# Patient Record
Sex: MALE | Race: WHITE | Employment: OTHER | URBAN - NONMETROPOLITAN AREA
[De-identification: names, ages, dates, MRNs, and addresses within clinical notes are randomized per-mention and may not be internally consistent; named-entity substitution may affect disease eponyms.]

---

## 2022-01-03 ENCOUNTER — OFFICE VISIT (OUTPATIENT)
Dept: PRIMARY CARE CLINIC | Age: 67
End: 2022-01-03
Payer: MEDICARE

## 2022-01-03 ENCOUNTER — HOSPITAL ENCOUNTER (OUTPATIENT)
Dept: GENERAL RADIOLOGY | Age: 67
Discharge: HOME | End: 2022-01-05
Payer: MEDICARE

## 2022-01-03 VITALS
RESPIRATION RATE: 18 BRPM | HEIGHT: 67 IN | WEIGHT: 167.5 LBS | SYSTOLIC BLOOD PRESSURE: 128 MMHG | HEART RATE: 60 BPM | DIASTOLIC BLOOD PRESSURE: 86 MMHG | TEMPERATURE: 97.4 F | OXYGEN SATURATION: 97 % | BODY MASS INDEX: 26.29 KG/M2

## 2022-01-03 DIAGNOSIS — M54.6 RIGHT-SIDED THORACIC BACK PAIN, UNSPECIFIED CHRONICITY: ICD-10-CM

## 2022-01-03 DIAGNOSIS — M54.6 RIGHT-SIDED THORACIC BACK PAIN, UNSPECIFIED CHRONICITY: Primary | ICD-10-CM

## 2022-01-03 PROCEDURE — 71101 X-RAY EXAM UNILAT RIBS/CHEST: CPT

## 2022-01-03 PROCEDURE — 99203 OFFICE O/P NEW LOW 30 MIN: CPT | Performed by: FAMILY MEDICINE

## 2022-01-03 PROCEDURE — 99203 OFFICE O/P NEW LOW 30 MIN: CPT

## 2022-01-03 ASSESSMENT — PATIENT HEALTH QUESTIONNAIRE - PHQ9
1. LITTLE INTEREST OR PLEASURE IN DOING THINGS: 0
SUM OF ALL RESPONSES TO PHQ QUESTIONS 1-9: 0
2. FEELING DOWN, DEPRESSED OR HOPELESS: 0
SUM OF ALL RESPONSES TO PHQ QUESTIONS 1-9: 0
SUM OF ALL RESPONSES TO PHQ9 QUESTIONS 1 & 2: 0

## 2022-01-03 NOTE — PROGRESS NOTES
1/3/2022     Geraldo Shoemaker (:  1955) is a 74 y.o. male, here for evaluation of the following medical concerns:    Chest Pain   This is a new problem. The current episode started yesterday (slipped on ice yesterday and landed on the right posterior rib region ). The onset quality is sudden. The problem occurs constantly. The problem has been unchanged. The pain is present in the lateral region (right lower posterior and lateral ribs). The pain is moderate. The quality of the pain is described as sharp. Associated symptoms include back pain. Pertinent negatives include no cough, fever or shortness of breath. He has tried acetaminophen for the symptoms. The treatment provided mild relief. Did review patient's med list, allergies, social history,pmhx and pshx today as noted in the record. Review of Systems   Constitutional: Negative for chills, fatigue and fever. Respiratory: Negative for cough and shortness of breath. Cardiovascular: Positive for chest pain. Musculoskeletal: Positive for back pain and myalgias. Prior to Visit Medications    Not on File        Social History     Tobacco Use    Smoking status: Never Smoker    Smokeless tobacco: Never Used   Substance Use Topics    Alcohol use: Not on file        Vitals:    22 1219   BP: 128/86   Site: Left Upper Arm   Position: Sitting   Cuff Size: Large Adult   Pulse: 60   Resp: 18   Temp: 97.4 °F (36.3 °C)   TempSrc: Temporal   SpO2: 97%   Weight: 167 lb 8 oz (76 kg)   Height: 5' 7\" (1.702 m)     Estimated body mass index is 26.23 kg/m² as calculated from the following:    Height as of this encounter: 5' 7\" (1.702 m). Weight as of this encounter: 167 lb 8 oz (76 kg). Physical Exam  Vitals and nursing note reviewed. Constitutional:       General: He is not in acute distress. Appearance: He is well-developed. He is not diaphoretic. HENT:      Head: Normocephalic and atraumatic.    Eyes:      Conjunctiva/sclera: Conjunctivae normal.   Pulmonary:      Effort: Pulmonary effort is normal.      Breath sounds: Normal breath sounds. No wheezing, rhonchi or rales. Comments: Right posterior lower ribs with pain with palpation to ribs 9 and 10. No hematoma noted to the skin surface. Moves he chest wall in a normal range of motion without significant difficulty. Chest:      Chest wall: Tenderness present. Musculoskeletal:      Cervical back: Normal range of motion. Skin:     General: Skin is warm and dry. Coloration: Skin is not pale. Findings: No erythema or rash. Neurological:      Mental Status: He is alert and oriented to person, place, and time. Psychiatric:         Behavior: Behavior normal.         Thought Content: Thought content normal.         Judgment: Judgment normal.         ASSESSMENT/PLAN:  Encounter Diagnosis   Name Primary?  Right-sided thoracic back pain, unspecified chronicity Yes     No orders of the defined types were placed in this encounter. Orders Placed This Encounter   Procedures    XR RIBS RIGHT INCLUDE CHEST (MIN 3 VIEWS)     Standing Status:   Future     Number of Occurrences:   1     Standing Expiration Date:   1/3/2023     Order Specific Question:   Reason for exam:     Answer:   R side rib pain following a fall yesterday     I did personally review his xrays with him today. I do not appreciate any acute fracture. Would use ice to the area and make sure that he is taking good deep breaths. Patient is to use tylenol or ibuprofen as needed for pain. Return  if no improvement in symptoms or if any further symptoms arise. No follow-ups on file. An electronic signature was used to authenticate this note.     --Janay Interiano DO on 1/3/2022 at 12:37 PM

## 2022-01-04 ASSESSMENT — ENCOUNTER SYMPTOMS
SHORTNESS OF BREATH: 0
COUGH: 0
BACK PAIN: 1

## 2022-01-14 ENCOUNTER — OFFICE VISIT (OUTPATIENT)
Dept: FAMILY MEDICINE CLINIC | Age: 67
End: 2022-01-14
Payer: MEDICARE

## 2022-01-14 VITALS
OXYGEN SATURATION: 98 % | TEMPERATURE: 98 F | HEART RATE: 63 BPM | DIASTOLIC BLOOD PRESSURE: 70 MMHG | WEIGHT: 166.8 LBS | BODY MASS INDEX: 26.18 KG/M2 | SYSTOLIC BLOOD PRESSURE: 130 MMHG | HEIGHT: 67 IN

## 2022-01-14 DIAGNOSIS — Z13.1 SCREENING FOR DIABETES MELLITUS: ICD-10-CM

## 2022-01-14 DIAGNOSIS — Z13.220 SCREENING CHOLESTEROL LEVEL: ICD-10-CM

## 2022-01-14 DIAGNOSIS — R07.81 RIB PAIN: ICD-10-CM

## 2022-01-14 DIAGNOSIS — N40.1 BENIGN PROSTATIC HYPERPLASIA WITH URINARY HESITANCY: Primary | ICD-10-CM

## 2022-01-14 DIAGNOSIS — Z12.5 SCREENING FOR MALIGNANT NEOPLASM OF PROSTATE: ICD-10-CM

## 2022-01-14 DIAGNOSIS — Z91.81 AT HIGH RISK FOR FALLS: ICD-10-CM

## 2022-01-14 DIAGNOSIS — R39.11 BENIGN PROSTATIC HYPERPLASIA WITH URINARY HESITANCY: Primary | ICD-10-CM

## 2022-01-14 PROCEDURE — 99212 OFFICE O/P EST SF 10 MIN: CPT

## 2022-01-14 PROCEDURE — 99213 OFFICE O/P EST LOW 20 MIN: CPT | Performed by: FAMILY MEDICINE

## 2022-01-14 SDOH — ECONOMIC STABILITY: FOOD INSECURITY: WITHIN THE PAST 12 MONTHS, THE FOOD YOU BOUGHT JUST DIDN'T LAST AND YOU DIDN'T HAVE MONEY TO GET MORE.: PATIENT DECLINED

## 2022-01-14 SDOH — ECONOMIC STABILITY: FOOD INSECURITY: WITHIN THE PAST 12 MONTHS, YOU WORRIED THAT YOUR FOOD WOULD RUN OUT BEFORE YOU GOT MONEY TO BUY MORE.: PATIENT DECLINED

## 2022-01-14 ASSESSMENT — SOCIAL DETERMINANTS OF HEALTH (SDOH): HOW HARD IS IT FOR YOU TO PAY FOR THE VERY BASICS LIKE FOOD, HOUSING, MEDICAL CARE, AND HEATING?: PATIENT DECLINED

## 2022-01-14 ASSESSMENT — ENCOUNTER SYMPTOMS
COUGH: 0
ABDOMINAL PAIN: 0
WHEEZING: 0
CHEST TIGHTNESS: 0
SHORTNESS OF BREATH: 0
DIARRHEA: 0
NAUSEA: 0

## 2022-01-14 NOTE — PROGRESS NOTES
[facility] [address]  [address] [address]  Dept: [phone]  Dept Fax: [phone]  Loc: [phone]    Jason eVga is a 74 y.o. male who presents today for his medical conditions/complaints as noted below. Jason Vega is c/o of   Chief Complaint   Patient presents with   [employer] Established New Doctor     keegan Mireles pt    Fall     injured right sided ribs last week getting out of camper       HPI:     HPI Here today to establish care. He was previously seeing Dr Nikkie Collier. He has been doing well. He occasionally has trouble initialing the urine stream in the morning and he occasionally gets up in the night to pee but not every night. He was on meds in the past for this but it made him really spaced out. He tries to urinate frequently to help keep his bladder empty. He has not pain with urination. He fell when he was climbing out of his camper and he slipped on ice on the step of his camper. He landed on the step on his back. The pain is also on the right lower rib. He was in UC on 1/12 and his xray was normal. He takes aleve occasionally. Past Medical History:   Diagnosis Date    Foot fracture 11/21/2016    left 5th Metatarsal fx- Dr. Carty Phlegm    History of pneumonia     Multiple fractures 06/06/2010    pelvis, sacrum, left ribs- due to 17 foot fall ( TV antenna tower)          Social History     Tobacco Use    Smoking status: Never Smoker    Smokeless tobacco: Never Used   Substance Use Topics    Alcohol use: Not on file     No current outpatient medications on file. No current facility-administered medications for this visit. Allergies   Allergen Reactions    Silodosin     Tamsulosin      Confusion       Subjective:     Review of Systems   Constitutional: Negative for activity change, appetite change, chills, fatigue and fever.    Eyes: Negative for visual disturbance. Respiratory: Negative for cough, chest tightness, shortness of breath and wheezing. Cardiovascular: Negative for chest pain, palpitations and leg swelling. Gastrointestinal: Negative for abdominal pain, diarrhea and nausea. Genitourinary: Negative for difficulty urinating. Skin: Negative for rash. Neurological: Negative for dizziness, syncope, weakness, light-headedness and headaches. Psychiatric/Behavioral: Negative for decreased concentration, dysphoric mood and sleep disturbance. The patient is not nervous/anxious. Objective:      Physical Exam  Vitals and nursing note reviewed. Constitutional:       General: He is not in acute distress. Appearance: He is well-developed. Eyes:      Conjunctiva/sclera: Conjunctivae normal.   Cardiovascular:      Rate and Rhythm: Normal rate and regular rhythm. Heart sounds: Normal heart sounds. No murmur heard. Pulmonary:      Effort: Pulmonary effort is normal. No respiratory distress. Breath sounds: Normal breath sounds. No wheezing or rales. Musculoskeletal:         General: Normal range of motion. Cervical back: Normal range of motion and neck supple. Lymphadenopathy:      Cervical: No cervical adenopathy. Skin:     General: Skin is warm and dry. Findings: No rash. Neurological:      Mental Status: He is alert and oriented to person, place, and time. Psychiatric:         Mood and Affect: Mood normal.         Behavior: Behavior normal.         Thought Content: Thought content normal.         Judgment: Judgment normal.       /70   Pulse 63   Temp 98 °F (36.7 °C)   Ht 5' 7\" (1.702 m)   Wt 166 lb 12.8 oz (75.7 kg)   SpO2 98%   BMI 26.12 kg/m²     Assessment:       Diagnosis Orders   1. Benign prostatic hyperplasia with urinary hesitancy     2. Rib pain     3. At high risk for falls     4. Screening for diabetes mellitus  Basic Metabolic Panel   5. Screening cholesterol level  Lipid Panel   6. Screening for malignant neoplasm of prostate  PSA Screening             Plan:        BPH: stable; he has been doing well without medication. He did not like the side effects of proscar so that was discontinued by his previous pcp. Rib pain: new; seems to be a bruise and some costochondritis. I recommended ice or heat and I suggested prednisone but he wanted to hold off on that for now. Return in about 6 months (around 7/14/2022) for [address] Orders Placed This Encounter   Procedures    Basic Metabolic Panel     Standing Status:   Future     Standing Expiration Date:   1/14/2023    Lipid Panel     Standing Status:   Future     Standing Expiration Date:   1/14/2023     Order Specific Question:   Is Patient Fasting?/# of Hours     Answer:   0 per dr Sidney Webb PSA Screening     Standing Status:   Future     Standing Expiration Date:   1/14/2023       Patientgiven educational materials - see patient instructions. Discussed use, benefit,and side effects of prescribed medications. All patient questions answered. Ptvoiced understanding. Reviewed health maintenance. Instructed to continue currentmedications, diet and exercise. Patient agreed with treatment plan. Follow up asdirected. Electronically signed by Fabian Vernon MD on 1/14/2022 at 10:58 AM  On the basis of positive falls risk screening, assessment and plan is as follows: home safety tips provided.

## 2022-07-12 ENCOUNTER — HOSPITAL ENCOUNTER (OUTPATIENT)
Dept: LAB | Age: 67
Discharge: HOME | End: 2022-07-12
Payer: MEDICARE

## 2022-07-12 DIAGNOSIS — Z13.1 SCREENING FOR DIABETES MELLITUS: ICD-10-CM

## 2022-07-12 DIAGNOSIS — Z12.5 SCREENING FOR MALIGNANT NEOPLASM OF PROSTATE: ICD-10-CM

## 2022-07-12 LAB
ANION GAP SERPL CALCULATED.3IONS-SCNC: 10 MMOL/L (ref 9–17)
BUN BLDV-MCNC: 18 MG/DL (ref 8–23)
BUN/CREAT BLD: 21 (ref 9–20)
CALCIUM SERPL-MCNC: 9.1 MG/DL (ref 8.6–10.4)
CHLORIDE BLD-SCNC: 108 MMOL/L (ref 98–107)
CO2: 24 MMOL/L (ref 20–31)
CREAT SERPL-MCNC: 0.87 MG/DL (ref 0.7–1.2)
GFR AFRICAN AMERICAN: >60 ML/MIN
GFR NON-AFRICAN AMERICAN: >60 ML/MIN
GFR SERPL CREATININE-BSD FRML MDRD: ABNORMAL ML/MIN/{1.73_M2}
GLUCOSE BLD-MCNC: 100 MG/DL (ref 70–99)
POTASSIUM SERPL-SCNC: 3.9 MMOL/L (ref 3.7–5.3)
PROSTATE SPECIFIC ANTIGEN: 2.62 NG/ML
SODIUM BLD-SCNC: 142 MMOL/L (ref 135–144)

## 2022-07-12 PROCEDURE — 80048 BASIC METABOLIC PNL TOTAL CA: CPT

## 2022-07-12 PROCEDURE — 36415 COLL VENOUS BLD VENIPUNCTURE: CPT

## 2022-07-12 PROCEDURE — G0103 PSA SCREENING: HCPCS

## 2022-07-19 ENCOUNTER — OFFICE VISIT (OUTPATIENT)
Dept: FAMILY MEDICINE CLINIC | Age: 67
End: 2022-07-19
Payer: MEDICARE

## 2022-07-19 VITALS
WEIGHT: 171 LBS | DIASTOLIC BLOOD PRESSURE: 68 MMHG | BODY MASS INDEX: 26.84 KG/M2 | HEART RATE: 58 BPM | SYSTOLIC BLOOD PRESSURE: 139 MMHG | HEIGHT: 67 IN | OXYGEN SATURATION: 98 % | TEMPERATURE: 97 F

## 2022-07-19 DIAGNOSIS — R39.11 BENIGN PROSTATIC HYPERPLASIA WITH URINARY HESITANCY: ICD-10-CM

## 2022-07-19 DIAGNOSIS — Z00.00 INITIAL MEDICARE ANNUAL WELLNESS VISIT: Primary | ICD-10-CM

## 2022-07-19 DIAGNOSIS — Z12.5 SCREENING FOR PROSTATE CANCER: ICD-10-CM

## 2022-07-19 DIAGNOSIS — N40.1 BENIGN PROSTATIC HYPERPLASIA WITH URINARY HESITANCY: ICD-10-CM

## 2022-07-19 DIAGNOSIS — M67.441 GANGLION CYST OF FINGER OF RIGHT HAND: ICD-10-CM

## 2022-07-19 DIAGNOSIS — K40.90 LEFT INGUINAL HERNIA: ICD-10-CM

## 2022-07-19 DIAGNOSIS — Z71.89 COUNSELING FOR LIVING WILL: ICD-10-CM

## 2022-07-19 PROCEDURE — G0438 PPPS, INITIAL VISIT: HCPCS | Performed by: FAMILY MEDICINE

## 2022-07-19 PROCEDURE — 99213 OFFICE O/P EST LOW 20 MIN: CPT

## 2022-07-19 PROCEDURE — 1123F ACP DISCUSS/DSCN MKR DOCD: CPT | Performed by: FAMILY MEDICINE

## 2022-07-19 PROCEDURE — 99214 OFFICE O/P EST MOD 30 MIN: CPT | Performed by: FAMILY MEDICINE

## 2022-07-19 RX ORDER — TADALAFIL 5 MG/1
5 TABLET ORAL DAILY
Qty: 90 TABLET | Refills: 1 | Status: SHIPPED | OUTPATIENT
Start: 2022-07-19

## 2022-07-19 ASSESSMENT — LIFESTYLE VARIABLES
HOW OFTEN DO YOU HAVE A DRINK CONTAINING ALCOHOL: MONTHLY OR LESS
HOW MANY STANDARD DRINKS CONTAINING ALCOHOL DO YOU HAVE ON A TYPICAL DAY: 1 OR 2

## 2022-07-19 ASSESSMENT — PATIENT HEALTH QUESTIONNAIRE - PHQ9
SUM OF ALL RESPONSES TO PHQ QUESTIONS 1-9: 0
SUM OF ALL RESPONSES TO PHQ QUESTIONS 1-9: 0
2. FEELING DOWN, DEPRESSED OR HOPELESS: 0
SUM OF ALL RESPONSES TO PHQ9 QUESTIONS 1 & 2: 0
SUM OF ALL RESPONSES TO PHQ QUESTIONS 1-9: 0
1. LITTLE INTEREST OR PLEASURE IN DOING THINGS: 0
SUM OF ALL RESPONSES TO PHQ QUESTIONS 1-9: 0

## 2022-07-19 ASSESSMENT — ENCOUNTER SYMPTOMS
CHEST TIGHTNESS: 0
ABDOMINAL PAIN: 0
WHEEZING: 0
CONSTIPATION: 0
DIARRHEA: 0
SHORTNESS OF BREATH: 0
BACK PAIN: 0
COUGH: 0

## 2022-07-19 NOTE — PATIENT INSTRUCTIONS
Personalized Preventive Plan for Shahriar Hollins - 7/19/2022  Medicare offers a range of preventive health benefits. Some of the tests and screenings are paid in full while other may be subject to a deductible, co-insurance, and/or copay. Some of these benefits include a comprehensive review of your medical history including lifestyle, illnesses that may run in your family, and various assessments and screenings as appropriate. After reviewing your medical record and screening and assessments performed today your provider may have ordered immunizations, labs, imaging, and/or referrals for you. A list of these orders (if applicable) as well as your Preventive Care list are included within your After Visit Summary for your review. Other Preventive Recommendations:    A preventive eye exam performed by an eye specialist is recommended every 1-2 years to screen for glaucoma; cataracts, macular degeneration, and other eye disorders. A preventive dental visit is recommended every 6 months. Try to get at least 150 minutes of exercise per week or 10,000 steps per day on a pedometer . Order or download the FREE \"Exercise & Physical Activity: Your Everyday Guide\" from [employer]. Call 0-930.969.4746 or search [employer] online. You need 9888-0084 mg of calcium and 5589-8119 IU of vitamin D per day. It is possible to meet your calcium requirement with diet alone, but a vitamin D supplement is usually necessary to meet this goal.  When exposed to the sun, use a sunscreen that protects against both UVA and UVB radiation with an SPF of 30 or greater. Reapply every 2 to 3 hours or after sweating, drying off with a towel, or swimming. Always wear a seat belt when traveling in a car. Always wear a helmet when riding a bicycle or motorcycle.

## 2022-07-19 NOTE — PROGRESS NOTES
[facility] [address]  [address] [address]  Dept: [phone]  Dept Fax: [phone]  Loc: [phone]    Conrad Ramsey is a 69 y.o. male who presents today for his medical conditions/complaints as noted below. Conrad Ramsey is c/o of   Chief Complaint   Patient presents with    Medicare AWV    6 Month Follow-Up              HPI:     Here today for his [address] and he thinks he has a hernia. He has also been having symptoms of BPH    Benign Prostatic Hypertrophy  This is a new problem. The current episode started more than 1 year ago. The problem has been gradually worsening since onset. Irritative symptoms include nocturia. Obstructive symptoms include incomplete emptying, a slower stream and a weak stream. Associated symptoms include hesitancy. Pertinent negatives include no chills, dysuria, hematuria or nausea. He is sexually active. The symptoms are aggravated by prolonged sitting. Past treatments include tamsulosin. The treatment provided no relief. He has been using treatment for 1 to 6 months. Hernia: new; He first noticed a few months ago that he has a lump on the left side in the groin. He doesn't remember any specific event that caused the hernia. He does not have much pain with the area. He noticed that it is easily moveable. No change in his bowels. No issues with blood in the stool. He has been otherwise doing well. He has not had any issues with chest pain or shortness of breath. No issues with regular headaches. No lightheadedness with standing. No issues with feeling anxious or depressed.        Past Medical History:   Diagnosis Date    Foot fracture 11/21/2016    left 5th Metatarsal fx- Dr. Constanza Nina    History of pneumonia     Multiple fractures 06/06/2010    pelvis, sacrum, left ribs- due to 17 foot fall ( TV antenna tower)          Social History     Tobacco Use    Smoking status: Never    Smokeless tobacco: Never   Substance Use Topics    Alcohol use: Not on file     Current Outpatient Medications   Medication Sig Dispense Refill    tadalafil (CIALIS) 5 MG tablet Take 1 tablet by mouth in the morning. 90 tablet 1     No current facility-administered medications for this visit. Allergies   Allergen Reactions    Silodosin     Tamsulosin      Confusion       Subjective:     Review of Systems   Constitutional:  Negative for activity change, appetite change, chills, fatigue and fever. Eyes:  Negative for visual disturbance. Respiratory:  Negative for cough, chest tightness, shortness of breath and wheezing. Cardiovascular:  Negative for chest pain, palpitations and leg swelling. Gastrointestinal:  Negative for abdominal pain, constipation, diarrhea and nausea. Endocrine: Negative for polydipsia, polyphagia and polyuria. Genitourinary:  Positive for hesitancy, incomplete emptying and nocturia. Negative for difficulty urinating, dysuria and hematuria. Musculoskeletal:  Negative for back pain and myalgias. Skin:  Negative for rash. Allergic/Immunologic: Negative for environmental allergies. Neurological:  Negative for dizziness, syncope, weakness, light-headedness and headaches. Psychiatric/Behavioral:  Negative for decreased concentration, dysphoric mood and sleep disturbance. The patient is not nervous/anxious. Objective:      Physical Exam  Vitals and nursing note reviewed. Constitutional:       General: He is not in acute distress. Appearance: He is well-developed. HENT:      Right Ear: Tympanic membrane, ear canal and external ear normal.      Left Ear: Tympanic membrane, ear canal and external ear normal.   Eyes:      Conjunctiva/sclera: Conjunctivae normal.   Neck:      Thyroid: No thyromegaly. Cardiovascular:      Rate and Rhythm: Normal rate and regular rhythm. Heart sounds: Normal heart sounds. No murmur heard.   Pulmonary: Effort: Pulmonary effort is normal. No respiratory distress. Breath sounds: Normal breath sounds. No wheezing or rales. Abdominal:      General: Bowel sounds are normal. There is no distension. Palpations: Abdomen is soft. Tenderness: There is no abdominal tenderness. There is no guarding. Hernia: A hernia is present. Hernia is present in the left inguinal area. Genitourinary:     Testes: Normal. Cremasteric reflex is present. Musculoskeletal:         General: Normal range of motion. Hands:       Cervical back: Normal range of motion and neck supple. Comments: Ganglion cyst   Lymphadenopathy:      Cervical: No cervical adenopathy. Skin:     General: Skin is warm and dry. Findings: No rash. Neurological:      Mental Status: He is alert and oriented to person, place, and time. Psychiatric:         Behavior: Behavior normal.         Judgment: Judgment normal.     /68   Pulse 58   Temp 97 °F (36.1 °C)   Ht 5' 7\" (1.702 m)   Wt 171 lb (77.6 kg)   SpO2 98%   BMI 26.78 kg/m²     Assessment:       Diagnosis Orders   1. Initial Medicare annual wellness visit  Basic Metabolic Panel      2. Counseling for living will  [address] [facility] Referral to [address] [place] [address] Specialist      3. Left inguinal hernia  [facility] General Surgery, [place]      4. Benign prostatic hyperplasia with urinary hesitancy  tadalafil (CIALIS) 5 MG tablet      5. Ganglion cyst of finger of right hand        6. Screening for prostate cancer  PSA Screening                Plan:       MWV: see other note    Hernia: new; on exam he has a left inguinal hernia so I referred him to general surgery for repair. BPH: worsening; he has been having a lot more symptoms recently and he did not tolerate flomax or something else which he thinks might have been doxazosin so I started him on cialis. He will let me know how it helps.     Ganglion cyst: stable; no recent change in size and it is not affecting his range of motion. He will let me know if he wants to have it surgically removed. Return for Medicare Annual Wellness Visit in 1 year. Orders Placed This Encounter   Procedures    Basic Metabolic Panel     Standing Status:   Future     Standing Expiration Date:   1/19/2024    PSA Screening     Standing Status:   Future     Standing Expiration Date:   1/19/2024    [facility] Referral to [facility] Clinical Specialist     Referral Priority:   Routine     Referral Type: Other     Referral Reason:   Specialty Services Required     Number of Visits Requested:   [address] Surgery, [place]     Referral Priority:   Routine     Referral Type:   Eval and Treat     Referral Reason:   Specialty Services Required     Requested Specialty:   General Surgery     Number of Visits Requested:   1       Orders Placed This Encounter   Medications    tadalafil (CIALIS) 5 MG tablet     Sig: Take 1 tablet by mouth in the morning. Dispense:  90 tablet     Refill:  1         Patientgiven educational materials - see patient instructions. Discussed use, benefit,and side effects of prescribed medications. All patient questions answered. Ptvoiced understanding. Reviewed health maintenance. Instructed to continue currentmedications, diet and exercise. Patient agreed with treatment plan. Follow up asdirected.      Electronically signed by Marcus Camacho MD on 7/20/2022 at 2:36 PM

## 2022-07-19 NOTE — PROGRESS NOTES
Medicare Annual Wellness Visit    Sukhjinder Aguilar is here for Medicare AWV and 6 Month Follow-Up (/)    Assessment & Plan   Initial Medicare annual wellness visit  -     Basic Metabolic Panel; Future  Counseling for living will  -     [facility] Referral to ACP Clinical Specialist  Left inguinal hernia  -     [facility] General Surgery, [place]  Benign prostatic hyperplasia with urinary hesitancy  -     tadalafil (CIALIS) 5 MG tablet; Take 1 tablet by mouth in the morning., Disp-90 tablet, R-1Normal  Ganglion cyst of finger of right hand  Screening for prostate cancer  -     PSA Screening; Future    Recommendations for Preventive Services Due: see orders and patient instructions/AVS.  Recommended screening schedule for the next 5-10 years is provided to the patient in written form: see Patient Instructions/AVS.     Return for Medicare Annual Wellness Visit in 1 year. Subjective   The following acute and/or chronic problems were also addressed today:  hernia    Patient's complete Health Risk Assessment and screening values have been reviewed and are found in Flowsheets. The following problems were reviewed today and where indicated follow up appointments were made and/or referrals ordered.     Positive Risk Factor Screenings with Interventions:             General Health and ACP:  General  In general, how would you say your health is?: Very Good  In the past 7 days, have you experienced any of the following: New or Increased Pain, New or Increased Fatigue, Loneliness, Social Isolation, Stress or Anger?: No  Do you get the social and emotional support that you need?: Yes  Do you have a Living Will?: (!) No    Advance Directives       Power of  Living Will ACP-Advance Directive ACP-Power of     Not on File Not on File Not on File Not on File        General Health Risk Interventions:  No Living Will: Patient referred to [place] Habits/Nutrition:  Physical Activity: Inactive    Days of Exercise per Week: 0 days    Minutes of Exercise per Session: 0 min     Have you lost any weight without trying in the past 3 months?: No  Body mass index: (!) 26.78  Have you seen the dentist within the past year?: Yes  Health Habits/Nutrition Interventions:  Nutritional issues:  educational materials for healthy, well-balanced diet provided             Objective   Vitals:    07/19/22 0755   BP: 139/68   Pulse: 58   Temp: 97 °F (36.1 °C)   SpO2: 98%   Weight: 171 lb (77.6 kg)   Height: 5' 7\" (1.702 m)      Body mass index is 26.78 kg/m². Allergies   Allergen Reactions    Silodosin     Tamsulosin      Confusion     Prior to Visit Medications    Medication Sig Taking? Authorizing Provider   tadalafil (CIALIS) 5 MG tablet Take 1 tablet by mouth in the morning.  Yes Gil Padilla MD       [facility] (Including outside providers/suppliers regularly involved in providing care):   Patient Care Team:  Gil Padilla MD as PCP - General (Family Medicine)  Gil Padilla MD as PCP - REHABILITATION [facility] Empaneled Provider     Reviewed and updated this visit:  Tobacco  Allergies  Meds  Problems  Med Hx  Surg Hx  Soc Hx  Fam Hx

## 2022-07-20 ASSESSMENT — ENCOUNTER SYMPTOMS: NAUSEA: 0

## 2022-07-21 ENCOUNTER — HOSPITAL ENCOUNTER (OUTPATIENT)
Dept: GENERAL RADIOLOGY | Age: 67
Discharge: HOME | End: 2022-07-23
Payer: MEDICARE

## 2022-07-21 ENCOUNTER — TELEPHONE (OUTPATIENT)
Dept: SURGERY | Age: 67
End: 2022-07-21

## 2022-07-21 ENCOUNTER — INITIAL CONSULT (OUTPATIENT)
Dept: SURGERY | Age: 67
End: 2022-07-21
Payer: MEDICARE

## 2022-07-21 ENCOUNTER — HOSPITAL ENCOUNTER (OUTPATIENT)
Dept: NON INVASIVE DIAGNOSTICS | Age: 67
Discharge: HOME | End: 2022-07-21
Payer: MEDICARE

## 2022-07-21 VITALS
DIASTOLIC BLOOD PRESSURE: 64 MMHG | HEART RATE: 78 BPM | SYSTOLIC BLOOD PRESSURE: 100 MMHG | BODY MASS INDEX: 27.09 KG/M2 | HEIGHT: 67 IN | TEMPERATURE: 98 F | WEIGHT: 172.6 LBS

## 2022-07-21 DIAGNOSIS — R19.00 ABDOMINAL WALL BULGE: ICD-10-CM

## 2022-07-21 DIAGNOSIS — Z01.818 PRE-OP TESTING: ICD-10-CM

## 2022-07-21 DIAGNOSIS — Z01.818 PRE-OP TESTING: Primary | ICD-10-CM

## 2022-07-21 PROCEDURE — 1123F ACP DISCUSS/DSCN MKR DOCD: CPT | Performed by: SURGERY

## 2022-07-21 PROCEDURE — 99215 OFFICE O/P EST HI 40 MIN: CPT | Performed by: SURGERY

## 2022-07-21 PROCEDURE — 71046 X-RAY EXAM CHEST 2 VIEWS: CPT

## 2022-07-21 PROCEDURE — 93005 ELECTROCARDIOGRAM TRACING: CPT

## 2022-07-21 PROCEDURE — 99204 OFFICE O/P NEW MOD 45 MIN: CPT | Performed by: SURGERY

## 2022-07-21 NOTE — TELEPHONE ENCOUNTER
[facility] SPECIALTY [facility]    Pre-Operative Evaluation/Consultation    Name:  Sukhjinder Aguilar                                         Age:  69 y.o. MRN:  [id number]       :  1955   Date:  2022         Sex: male    There were no encounter diagnoses. Surgeon:  Dr. Tan Owens  Procedure (Planned):  LRA left inguinal hernia repair  Date Scheduled surgery: 22    Attending : No att. providers found    Primary Physician: Jojo Horner  Cardiologist: None    Type of Anesthesia Requested: General    Patient Medical history: Allergies   Allergen Reactions    Silodosin     Tamsulosin      Confusion     Patient Active Problem List   Diagnosis    Left inguinal hernia    Benign prostatic hyperplasia with urinary hesitancy    Ganglion cyst of finger of right hand     Past Medical History:   Diagnosis Date    Foot fracture 2016    left 5th Metatarsal fx- Dr. Anahi William    History of pneumonia     Multiple fractures 2010    pelvis, sacrum, left ribs- due to 17 foot fall ( TV antenna tower)     No past surgical history on file. Social History     Tobacco Use    Smoking status: Never    Smokeless tobacco: Never     Medications:  Current Outpatient Medications   Medication Sig Dispense Refill    tadalafil (CIALIS) 5 MG tablet Take 1 tablet by mouth in the morning. 90 tablet 1     No current facility-administered medications for this visit. Scheduled Meds:  Continuous Infusions:  PRN Meds:. Prior to Admission medications    Medication Sig Start Date End Date Taking? Authorizing Provider   tadalafil (CIALIS) 5 MG tablet Take 1 tablet by mouth in the morning. 22   Blu Dias MD     Vital Signs (Current) [unfilled]    Weight:   Wt Readings from Last 1 Encounters:   22 172 lb 9.6 oz (78.3 kg)     Height:   Ht Readings from Last 1 Encounters:   22 5' 7\" (1.702 m)      BMI:  There is no height or weight on file to calculate BMI.   Estimated body mass index is 27.03 kg/m² as calculated from the following:    Height as of an earlier encounter on 7/21/22: 5' 7\" (1.702 m). Weight as of an earlier encounter on 7/21/22: 172 lb 9.6 oz (78.3 kg). body mass index is unknown because there is no height or weight on file. Cardiac Clearance: None   Medical Clearance:None   Appointment for surgery Clearance scheduled for:None     Preoperative Testing: These are the current and completed labs:  CBC: No results found for: WBC, RBC, HGB, HCT, MCV, RDW, PLT  CMP:   Lab Results   Component Value Date/Time     07/12/2022 08:01 AM    K 3.9 07/12/2022 08:01 AM     07/12/2022 08:01 AM    CO2 24 07/12/2022 08:01 AM    BUN 18 07/12/2022 08:01 AM    CREATININE 0.87 07/12/2022 08:01 AM    GFRAA >60 07/12/2022 08:01 AM    LABGLOM >60 07/12/2022 08:01 AM    GLUCOSE 100 07/12/2022 08:01 AM    CALCIUM 9.1 07/12/2022 08:01 AM     POC Tests: No results for input(s): POCGLU, POCNA, POCK, POCCL, POCBUN, POCHEMO, POCHCT in the last 72 hours. Coags  No results found for: PROTIME, INR, APTT  HCG (If Applicable) No results found for: PREGTESTUR, PREGSERUM, HCG, HCGQUANT   ABGs No results found for: PHART, PO2ART, VZC7IPM, YLG3DLE, BEART, C1FDFSJO   Type & Screen (If Applicable)  No results found for: Maryjane Phillips    Additional ordered pre-operative testing:  []CBC    []ABG      [] BMP   []URINALYSIS   []CMP    []HCG   []COAGS PT/INR  []T&C  []LFTs   []TYPE AND SCREEN    [x] EKG  [x] Chest X-Ray  [] Other Radiology    [] Sent to Hospitalist None  [] Sent to Anesthesia for your review:  CRNAs    [] Additional Orders: None     Comments:None   Requests: No Special requests    Signed:  Phares Ormond, LPN 3/75/0617 0:54 PM

## 2022-07-21 NOTE — PROGRESS NOTES
Subjective   Joseph Euceda is a 69 y.o. male who presents today for evaluation of possible left inguinal hernia. The patient reports that for the past couple weeks he has been noticing some discomfort in the left groin and has also begun to notice a bulge in the left groin. Does not remember any specific inciting event. He does do a lot of physical activity and lifting with his job but does not remember a specific event where he had onset of pain. He really denies any significant pain in the groin but just states that since he has begun to notice the bulge she can kind of feel an abnormal sensation in the area. Denies any changes in bowel movements. No nausea or emesis. Was seen by his PCP and told he had a little inguinal hernia and was referred to general surgery. No prior hernia repairs. While he is here he also is describing a bulge in the right upper quadrant of the abdomen just below the costal margin at approximately midclavicular line. States that this has been present for about 40 years. He states that when it first occurred he was doing some work where he was bending over and lifting and had some pain and a bulge in the area. Since that time he states that occasionally he will be in a certain position and feel the pain and very seldom will see the bulge recur. Was seen by general surgeon in the past who told him that he did not feel that there was a hernia there but was doing some further work-up but I guess this never occurred. Patient reports that the bulge is usually about the size of a golf ball. States that when he pushes on it can be somewhat tender from time to time. No other complaints.     Past Medical History:   Diagnosis Date    Foot fracture 11/21/2016    left 5th Metatarsal fx- Dr. Dm Irvin    History of pneumonia     Multiple fractures 06/06/2010    pelvis, sacrum, left ribs- due to 17 foot fall ( TV antenna tower)       No past surgical history on file.    Current Outpatient Medications   Medication Sig Dispense Refill    tadalafil (CIALIS) 5 MG tablet Take 1 tablet by mouth in the morning. 90 tablet 1     No current facility-administered medications for this visit. Allergies   Allergen Reactions    Silodosin     Tamsulosin      Confusion       No family history on file.     Social History     Socioeconomic History    Marital status:      Spouse name: Not on file    Number of children: Not on file    Years of education: Not on file    Highest education level: Not on file   Occupational History    Not on file   Tobacco Use    Smoking status: Never    Smokeless tobacco: Never   Substance and Sexual Activity    Alcohol use: Not on file    Drug use: Not on file    Sexual activity: Not on file   Other Topics Concern    Not on file   Social History Narrative    Not on file     Social Determinants of Health     Financial Resource Strain: Unknown    Difficulty of Paying Living Expenses: Patient refused   Food Insecurity: Unknown    Worried About Running Out of Food in the Last Year: Patient refused    Ran Out of Food in the Last Year: Patient refused   Transportation Needs: Not on file   Physical Activity: Inactive    Days of Exercise per Week: 0 days    Minutes of Exercise per Session: 0 min   Stress: Not on file   Social Connections: Not on file   Intimate Partner Violence: Not on file   Housing Stability: Not on file       ROS:   Review of Systems - General ROS: negative  Psychological ROS: negative  Ophthalmic ROS: negative  ENT ROS: negative  Respiratory ROS: no cough, shortness of breath, or wheezing  Cardiovascular ROS: no chest pain or dyspnea on exertion  Gastrointestinal ROS: per HPI  Genito-Urinary ROS: no dysuria, trouble voiding, or hematuria  Musculoskeletal ROS: negative  Dermatological ROS: negative      Objective   Vitals:    07/21/22 1352   BP: 100/64   Pulse: 78   Temp: 98 °F (36.7 °C)     General:in no apparent distress and well developed and well nourished  Eyes: No gross abnormalities. Ears, Nose, Throat: hearing grossly normal bilaterally  Neck: neck supple and non tender without mass  Lungs: clear to auscultation without wheezes or rales   Heart: S1S2, no mumurs, RRR  Abdomen: Soft, nondistended, nontender to palpation, bowel sounds present. Patient does have a noticeable hernia in the left groin that occurs with Valsalva but is reducible. No noticeable defect or bulge in the right groin. In the upper abdomen in the right upper quadrant below costal margin in the area of the patient concern I do not palpate any defect in the abdominal wall in this area. No pain with palpation. No pain with palpation of the costal margin. Extremity: negative  Neuro: CN II-XII grossly intact      Assessment     1. Left inguinal hernia-reducible  2 abdominal wall bulge and right upper quadrant      Plan     1. I will obtain ultrasound of the abdominal wall in the area of concern at the right upper quadrant to evaluate for any possible small hernia defect. Do not appreciate any abnormalities on exam.  2.  We will plan for left inguinal hernia possible bilateral.  We will plan for robotic assisted laparoscopic hernia repair. Risks of the procedure including bleeding, infection, scarring, pain, damage surrounding structures including spermatic cord structures, potential loss of testicle, recurrence, mesh complications and anesthesia risk are discussed and consent is obtained. .  3.  Preop testing to include EKG and chest x-ray.     Electronically signed by Abigail Garcia DO on 7/21/2022 at 4:36 PM      (Please note that portions of this note were completed with a voice recognition program.  Efforts were made to edit the dictations but occasionally words are mis-transcribed.)

## 2022-07-23 LAB
EKG ATRIAL RATE: 59 BPM
EKG P AXIS: 74 DEGREES
EKG P-R INTERVAL: 228 MS
EKG Q-T INTERVAL: 382 MS
EKG QRS DURATION: 86 MS
EKG QTC CALCULATION (BAZETT): 378 MS
EKG R AXIS: 70 DEGREES
EKG T AXIS: 48 DEGREES
EKG VENTRICULAR RATE: 59 BPM

## 2022-07-27 ENCOUNTER — HOSPITAL ENCOUNTER (OUTPATIENT)
Dept: INTERVENTIONAL RADIOLOGY/VASCULAR | Age: 67
Discharge: HOME | End: 2022-07-29
Payer: MEDICARE

## 2022-07-27 DIAGNOSIS — R19.00 ABDOMINAL WALL BULGE: ICD-10-CM

## 2022-07-27 PROCEDURE — 76705 ECHO EXAM OF ABDOMEN: CPT

## 2022-07-28 ENCOUNTER — TELEPHONE (OUTPATIENT)
Dept: SPIRITUAL SERVICES | Age: 67
End: 2022-07-28

## 2022-07-28 ASSESSMENT — PATIENT HEALTH QUESTIONNAIRE - PHQ9
SUM OF ALL RESPONSES TO PHQ QUESTIONS 1-9: 0
SUM OF ALL RESPONSES TO PHQ9 QUESTIONS 1 & 2: 0
1. LITTLE INTEREST OR PLEASURE IN DOING THINGS: 0
2. FEELING DOWN, DEPRESSED OR HOPELESS: 0
SUM OF ALL RESPONSES TO PHQ QUESTIONS 1-9: 0

## 2022-07-28 NOTE — TELEPHONE ENCOUNTER
On 7/13/22 Werner's wife made an appointment for both to have ACP discussion and possibly complete Advance Directives. Appointment is August 10 at 9:00.

## 2022-07-28 NOTE — FLOWSHEET NOTE
On 7/13/22 Werner's wife made an appointment for both to have ACP discussion and possibly complete Advance Directives.  Appointment is August 10 at 9:00.   07/28/22 1201   Encounter Summary   Service Provided For: Family   Referral/Consult From: Physician   Last Encounter  07/28/22   Complexity of Encounter Low   Begin Time 1158   End Time  1202   Total Time Calculated 4 min

## 2022-07-28 NOTE — ACP (ADVANCE CARE PLANNING)
Advance Care Planning   Ambulatory ACP Specialist Patient Outreach    Date:  7/28/2022  ACP Specialist:  Venkatesh Villa    Outreach call to patient in follow-up to ACP Specialist referral from: Hazel Woodward MD    [] PCP  [] Provider   [] Ambulatory Care Management [] Other for Reason:    [x] Advance Directive Assistance  [] Code Status Discussion  [] Complete Portable DNR Order  [] Discuss Goals of Care  [] Complete POST/MOST  [x] Early ACP Decision-Making  [] Other    Date Referral Received:7/19/22    Today's Outreach:  [x] First   [] Second  [] Third                               Third outreach made by []  phone  [] email []   Internet Broadcasting[employer]     Intervention:  [] Spoke with Patient  [] Left VM requesting return call      Outcome:On 7/13/22 Werner's wife made an appointment for both to have ACP discussion and possibly complete Advance Directives. Appointment is August 10 at 9:00. Next Step:   [x] ACP scheduled conversation  [] Outreach again in one week               [] Email / Mail ACP Info Sheets  [] Email / Mail Advance Directive            [] Close Referral. Routing closure to referring provider/staff and to ACP Specialist . [] Closure Letter mailed to Patient with Invitation to Contact ACP Specialist if/when ready.     Thank you for this referral.

## 2022-07-29 ENCOUNTER — TELEPHONE (OUTPATIENT)
Dept: SURGERY | Age: 67
End: 2022-07-29

## 2022-07-29 NOTE — TELEPHONE ENCOUNTER
Patient called the office. Patient is asking to reschedule his upcoming surgery with Dr Armando Machado. (Laparoscopic Robotic Assisted Left Inguinal Hernia Repair w/ mesh 8/12/22) Patient would like to have the surgery after Labor Day.  Please call the patient back @ # [phone]

## 2022-08-09 ENCOUNTER — TELEPHONE (OUTPATIENT)
Dept: SPIRITUAL SERVICES | Age: 67
End: 2022-08-09

## 2022-09-15 RX ORDER — MORPHINE SULFATE 2 MG/ML
2 INJECTION, SOLUTION INTRAMUSCULAR; INTRAVENOUS EVERY 5 MIN PRN
Status: CANCELLED | OUTPATIENT
Start: 2022-09-15

## 2022-09-15 RX ORDER — SODIUM CHLORIDE 0.9 % (FLUSH) 0.9 %
5-40 SYRINGE (ML) INJECTION EVERY 12 HOURS SCHEDULED
Status: CANCELLED | OUTPATIENT
Start: 2022-09-15

## 2022-09-15 RX ORDER — MORPHINE SULFATE 2 MG/ML
1 INJECTION, SOLUTION INTRAMUSCULAR; INTRAVENOUS EVERY 5 MIN PRN
Status: CANCELLED | OUTPATIENT
Start: 2022-09-15

## 2022-09-15 RX ORDER — OXYCODONE HYDROCHLORIDE 5 MG/1
10 TABLET ORAL PRN
Status: CANCELLED | OUTPATIENT
Start: 2022-09-15 | End: 2022-09-15

## 2022-09-15 RX ORDER — OXYCODONE HYDROCHLORIDE 5 MG/1
5 TABLET ORAL PRN
Status: CANCELLED | OUTPATIENT
Start: 2022-09-15 | End: 2022-09-15

## 2022-09-15 RX ORDER — SODIUM CHLORIDE 0.9 % (FLUSH) 0.9 %
5-40 SYRINGE (ML) INJECTION PRN
Status: CANCELLED | OUTPATIENT
Start: 2022-09-15

## 2022-09-15 RX ORDER — SODIUM CHLORIDE 9 MG/ML
INJECTION, SOLUTION INTRAVENOUS PRN
Status: CANCELLED | OUTPATIENT
Start: 2022-09-15

## 2022-09-15 RX ORDER — ONDANSETRON 2 MG/ML
4 INJECTION INTRAMUSCULAR; INTRAVENOUS
Status: CANCELLED | OUTPATIENT
Start: 2022-09-15 | End: 2022-09-15

## 2022-09-16 ENCOUNTER — ANESTHESIA (OUTPATIENT)
Dept: OPERATING ROOM | Age: 67
End: 2022-09-16
Payer: MEDICARE

## 2022-09-16 ENCOUNTER — ANESTHESIA EVENT (OUTPATIENT)
Dept: OPERATING ROOM | Age: 67
End: 2022-09-16
Payer: MEDICARE

## 2022-09-16 ENCOUNTER — HOSPITAL ENCOUNTER (OUTPATIENT)
Age: 67
Setting detail: OUTPATIENT SURGERY
Discharge: HOME | End: 2022-09-16
Attending: SURGERY | Admitting: SURGERY
Payer: MEDICARE

## 2022-09-16 VITALS
TEMPERATURE: 98 F | RESPIRATION RATE: 16 BRPM | WEIGHT: 163 LBS | OXYGEN SATURATION: 95 % | BODY MASS INDEX: 25.58 KG/M2 | SYSTOLIC BLOOD PRESSURE: 137 MMHG | HEIGHT: 67 IN | DIASTOLIC BLOOD PRESSURE: 80 MMHG | HEART RATE: 66 BPM

## 2022-09-16 DIAGNOSIS — G89.18 POST-OP PAIN: Primary | ICD-10-CM

## 2022-09-16 PROCEDURE — 3700000001 HC ADD 15 MINUTES (ANESTHESIA): Performed by: SURGERY

## 2022-09-16 PROCEDURE — 2580000003 HC RX 258: Performed by: NURSE ANESTHETIST, CERTIFIED REGISTERED

## 2022-09-16 PROCEDURE — C1781 MESH (IMPLANTABLE): HCPCS | Performed by: SURGERY

## 2022-09-16 PROCEDURE — 6360000002 HC RX W HCPCS: Performed by: NURSE ANESTHETIST, CERTIFIED REGISTERED

## 2022-09-16 PROCEDURE — 7100000011 HC PHASE II RECOVERY - ADDTL 15 MIN: Performed by: SURGERY

## 2022-09-16 PROCEDURE — 2500000003 HC RX 250 WO HCPCS: Performed by: SURGERY

## 2022-09-16 PROCEDURE — 6360000002 HC RX W HCPCS: Performed by: SURGERY

## 2022-09-16 PROCEDURE — 7100000010 HC PHASE II RECOVERY - FIRST 15 MIN: Performed by: SURGERY

## 2022-09-16 PROCEDURE — 3700000000 HC ANESTHESIA ATTENDED CARE: Performed by: SURGERY

## 2022-09-16 PROCEDURE — 3600000009 HC SURGERY ROBOT BASE: Performed by: SURGERY

## 2022-09-16 PROCEDURE — 2709999900 HC NON-CHARGEABLE SUPPLY: Performed by: SURGERY

## 2022-09-16 PROCEDURE — 3600000019 HC SURGERY ROBOT ADDTL 15MIN: Performed by: SURGERY

## 2022-09-16 PROCEDURE — 2580000003 HC RX 258: Performed by: SURGERY

## 2022-09-16 PROCEDURE — 49650 LAP ING HERNIA REPAIR INIT: CPT | Performed by: SURGERY

## 2022-09-16 PROCEDURE — 7100000000 HC PACU RECOVERY - FIRST 15 MIN: Performed by: SURGERY

## 2022-09-16 PROCEDURE — 7100000001 HC PACU RECOVERY - ADDTL 15 MIN: Performed by: SURGERY

## 2022-09-16 PROCEDURE — 2500000003 HC RX 250 WO HCPCS: Performed by: NURSE ANESTHETIST, CERTIFIED REGISTERED

## 2022-09-16 PROCEDURE — S2900 ROBOTIC SURGICAL SYSTEM: HCPCS | Performed by: SURGERY

## 2022-09-16 DEVICE — MESH HERN W10XL15CM POLY POLYLACTIC ACID 70% CLLGN 30% GLYC: Type: IMPLANTABLE DEVICE | Site: GROIN | Status: FUNCTIONAL

## 2022-09-16 RX ORDER — EPHEDRINE SULFATE/0.9% NACL/PF 50 MG/5 ML
SYRINGE (ML) INTRAVENOUS PRN
Status: DISCONTINUED | OUTPATIENT
Start: 2022-09-16 | End: 2022-09-16 | Stop reason: SDUPTHER

## 2022-09-16 RX ORDER — LABETALOL HYDROCHLORIDE 5 MG/ML
INJECTION, SOLUTION INTRAVENOUS PRN
Status: DISCONTINUED | OUTPATIENT
Start: 2022-09-16 | End: 2022-09-16 | Stop reason: SDUPTHER

## 2022-09-16 RX ORDER — LIDOCAINE HYDROCHLORIDE 20 MG/ML
INJECTION, SOLUTION EPIDURAL; INFILTRATION; INTRACAUDAL; PERINEURAL PRN
Status: DISCONTINUED | OUTPATIENT
Start: 2022-09-16 | End: 2022-09-16 | Stop reason: SDUPTHER

## 2022-09-16 RX ORDER — ONDANSETRON 2 MG/ML
INJECTION INTRAMUSCULAR; INTRAVENOUS PRN
Status: DISCONTINUED | OUTPATIENT
Start: 2022-09-16 | End: 2022-09-16 | Stop reason: SDUPTHER

## 2022-09-16 RX ORDER — SODIUM CHLORIDE, SODIUM LACTATE, POTASSIUM CHLORIDE, CALCIUM CHLORIDE 600; 310; 30; 20 MG/100ML; MG/100ML; MG/100ML; MG/100ML
INJECTION, SOLUTION INTRAVENOUS CONTINUOUS
Status: DISCONTINUED | OUTPATIENT
Start: 2022-09-16 | End: 2022-09-16 | Stop reason: HOSPADM

## 2022-09-16 RX ORDER — PROPOFOL 10 MG/ML
INJECTION, EMULSION INTRAVENOUS PRN
Status: DISCONTINUED | OUTPATIENT
Start: 2022-09-16 | End: 2022-09-16 | Stop reason: SDUPTHER

## 2022-09-16 RX ORDER — GLYCOPYRROLATE 1 MG/5 ML
SYRINGE (ML) INTRAVENOUS PRN
Status: DISCONTINUED | OUTPATIENT
Start: 2022-09-16 | End: 2022-09-16 | Stop reason: SDUPTHER

## 2022-09-16 RX ORDER — ROCURONIUM BROMIDE 10 MG/ML
INJECTION, SOLUTION INTRAVENOUS PRN
Status: DISCONTINUED | OUTPATIENT
Start: 2022-09-16 | End: 2022-09-16 | Stop reason: SDUPTHER

## 2022-09-16 RX ORDER — SODIUM CHLORIDE 0.9 % (FLUSH) 0.9 %
5-40 SYRINGE (ML) INJECTION PRN
Status: DISCONTINUED | OUTPATIENT
Start: 2022-09-16 | End: 2022-09-16 | Stop reason: HOSPADM

## 2022-09-16 RX ORDER — DIPHENHYDRAMINE HYDROCHLORIDE 50 MG/ML
INJECTION INTRAMUSCULAR; INTRAVENOUS PRN
Status: DISCONTINUED | OUTPATIENT
Start: 2022-09-16 | End: 2022-09-16 | Stop reason: SDUPTHER

## 2022-09-16 RX ORDER — KETOROLAC TROMETHAMINE 30 MG/ML
INJECTION, SOLUTION INTRAMUSCULAR; INTRAVENOUS PRN
Status: DISCONTINUED | OUTPATIENT
Start: 2022-09-16 | End: 2022-09-16 | Stop reason: SDUPTHER

## 2022-09-16 RX ORDER — MORPHINE SULFATE 10 MG/ML
INJECTION, SOLUTION INTRAMUSCULAR; INTRAVENOUS PRN
Status: DISCONTINUED | OUTPATIENT
Start: 2022-09-16 | End: 2022-09-16 | Stop reason: SDUPTHER

## 2022-09-16 RX ORDER — NEOSTIGMINE METHYLSULFATE 1 MG/ML
INJECTION, SOLUTION INTRAVENOUS PRN
Status: DISCONTINUED | OUTPATIENT
Start: 2022-09-16 | End: 2022-09-16 | Stop reason: SDUPTHER

## 2022-09-16 RX ORDER — SODIUM CHLORIDE 9 MG/ML
INJECTION, SOLUTION INTRAVENOUS PRN
Status: DISCONTINUED | OUTPATIENT
Start: 2022-09-16 | End: 2022-09-16 | Stop reason: HOSPADM

## 2022-09-16 RX ORDER — DEXAMETHASONE SODIUM PHOSPHATE 10 MG/ML
INJECTION INTRAMUSCULAR; INTRAVENOUS PRN
Status: DISCONTINUED | OUTPATIENT
Start: 2022-09-16 | End: 2022-09-16 | Stop reason: SDUPTHER

## 2022-09-16 RX ORDER — HYDROCODONE BITARTRATE AND ACETAMINOPHEN 5; 325 MG/1; MG/1
1 TABLET ORAL EVERY 6 HOURS PRN
Qty: 20 TABLET | Refills: 0 | Status: SHIPPED | OUTPATIENT
Start: 2022-09-16 | End: 2022-09-21

## 2022-09-16 RX ORDER — SODIUM CHLORIDE, SODIUM LACTATE, POTASSIUM CHLORIDE, CALCIUM CHLORIDE 600; 310; 30; 20 MG/100ML; MG/100ML; MG/100ML; MG/100ML
INJECTION, SOLUTION INTRAVENOUS CONTINUOUS PRN
Status: DISCONTINUED | OUTPATIENT
Start: 2022-09-16 | End: 2022-09-16 | Stop reason: SDUPTHER

## 2022-09-16 RX ORDER — SODIUM CHLORIDE 0.9 % (FLUSH) 0.9 %
5-40 SYRINGE (ML) INJECTION EVERY 12 HOURS SCHEDULED
Status: DISCONTINUED | OUTPATIENT
Start: 2022-09-16 | End: 2022-09-16 | Stop reason: HOSPADM

## 2022-09-16 RX ADMIN — Medication 10 MG: at 07:47

## 2022-09-16 RX ADMIN — KETOROLAC TROMETHAMINE 30 MG: 30 INJECTION, SOLUTION INTRAMUSCULAR at 08:34

## 2022-09-16 RX ADMIN — DEXAMETHASONE SODIUM PHOSPHATE 10 MG: 10 INJECTION INTRAMUSCULAR; INTRAVENOUS at 07:46

## 2022-09-16 RX ADMIN — LABETALOL HYDROCHLORIDE 5 MG: 5 INJECTION INTRAVENOUS at 08:05

## 2022-09-16 RX ADMIN — SODIUM CHLORIDE, POTASSIUM CHLORIDE, SODIUM LACTATE AND CALCIUM CHLORIDE: 600; 310; 30; 20 INJECTION, SOLUTION INTRAVENOUS at 08:08

## 2022-09-16 RX ADMIN — NEOSTIGMINE METHYLSULFATE 2.5 MG: 1 INJECTION, SOLUTION INTRAVENOUS at 08:46

## 2022-09-16 RX ADMIN — MORPHINE SULFATE 5 MG: 10 INJECTION, SOLUTION INTRAMUSCULAR; INTRAVENOUS at 08:02

## 2022-09-16 RX ADMIN — Medication 0.4 MG: at 08:46

## 2022-09-16 RX ADMIN — Medication 10 MG: at 07:50

## 2022-09-16 RX ADMIN — Medication 0.2 MG: at 07:49

## 2022-09-16 RX ADMIN — ROCURONIUM BROMIDE 30 MG: 10 INJECTION, SOLUTION INTRAVENOUS at 07:36

## 2022-09-16 RX ADMIN — SODIUM CHLORIDE, POTASSIUM CHLORIDE, SODIUM LACTATE AND CALCIUM CHLORIDE: 600; 310; 30; 20 INJECTION, SOLUTION INTRAVENOUS at 07:02

## 2022-09-16 RX ADMIN — SODIUM CHLORIDE, POTASSIUM CHLORIDE, SODIUM LACTATE AND CALCIUM CHLORIDE: 600; 310; 30; 20 INJECTION, SOLUTION INTRAVENOUS at 07:29

## 2022-09-16 RX ADMIN — ONDANSETRON 4 MG: 2 INJECTION INTRAMUSCULAR; INTRAVENOUS at 07:46

## 2022-09-16 RX ADMIN — PROPOFOL 150 MG: 10 INJECTION, EMULSION INTRAVENOUS at 07:36

## 2022-09-16 RX ADMIN — LIDOCAINE HYDROCHLORIDE 100 MG: 20 INJECTION, SOLUTION EPIDURAL; INFILTRATION; INTRACAUDAL; PERINEURAL at 07:35

## 2022-09-16 RX ADMIN — Medication 2000 MG: at 07:44

## 2022-09-16 RX ADMIN — DIPHENHYDRAMINE HYDROCHLORIDE 20 MG: 50 INJECTION, SOLUTION INTRAMUSCULAR; INTRAVENOUS at 07:58

## 2022-09-16 RX ADMIN — Medication 10 MG: at 07:45

## 2022-09-16 RX ADMIN — Medication 5 MG: at 08:47

## 2022-09-16 RX ADMIN — Medication 5 MG: at 08:35

## 2022-09-16 RX ADMIN — ROCURONIUM BROMIDE 20 MG: 10 INJECTION, SOLUTION INTRAVENOUS at 07:46

## 2022-09-16 RX ADMIN — Medication 10 MG: at 07:43

## 2022-09-16 ASSESSMENT — PAIN SCALES - GENERAL
PAINLEVEL_OUTOF10: 0

## 2022-09-16 ASSESSMENT — PAIN - FUNCTIONAL ASSESSMENT: PAIN_FUNCTIONAL_ASSESSMENT: 0-10

## 2022-09-16 NOTE — H&P
Subjective   Conrad Ramsey is a 69 y.o. male who presents today for evaluation of possible left inguinal hernia. The patient reports that for the past couple weeks he has been noticing some discomfort in the left groin and has also begun to notice a bulge in the left groin. Does not remember any specific inciting event. He does do a lot of physical activity and lifting with his job but does not remember a specific event where he had onset of pain. He really denies any significant pain in the groin but just states that since he has begun to notice the bulge she can kind of feel an abnormal sensation in the area. Denies any changes in bowel movements. No nausea or emesis. Was seen by his PCP and told he had a little inguinal hernia and was referred to general surgery. No prior hernia repairs. While he is here he also is describing a bulge in the right upper quadrant of the abdomen just below the costal margin at approximately midclavicular line. States that this has been present for about 40 years. He states that when it first occurred he was doing some work where he was bending over and lifting and had some pain and a bulge in the area. Since that time he states that occasionally he will be in a certain position and feel the pain and very seldom will see the bulge recur. Was seen by general surgeon in the past who told him that he did not feel that there was a hernia there but was doing some further work-up but I guess this never occurred. Patient reports that the bulge is usually about the size of a golf ball. States that when he pushes on it can be somewhat tender from time to time. No other complaints.      Past Medical History        Past Medical History:   Diagnosis Date    Foot fracture 11/21/2016     left 5th Metatarsal fx- Dr. Constanza Nina    History of pneumonia      Multiple fractures 06/06/2010     pelvis, sacrum, left ribs- due to 17 foot fall ( TV antenna tower) Past Surgical History   No past surgical history on file. Current Facility-Administered Medications          Current Outpatient Medications   Medication Sig Dispense Refill    tadalafil (CIALIS) 5 MG tablet Take 1 tablet by mouth in the morning. 90 tablet 1      No current facility-administered medications for this visit. Allergies   Allergen Reactions    Silodosin      Tamsulosin         Confusion         Family History   No family history on file.         Social History               Socioeconomic History    Marital status:        Spouse name: Not on file    Number of children: Not on file    Years of education: Not on file    Highest education level: Not on file   Occupational History    Not on file   Tobacco Use    Smoking status: Never    Smokeless tobacco: Never   Substance and Sexual Activity    Alcohol use: Not on file    Drug use: Not on file    Sexual activity: Not on file   Other Topics Concern    Not on file   Social History Narrative    Not on file      Social Determinants of Health          Financial Resource Strain: Unknown    Difficulty of Paying Living Expenses: Patient refused   Food Insecurity: Unknown    Worried About Running Out of Food in the Last Year: Patient refused    Ran Out of Food in the Last Year: Patient refused   Transportation Needs: Not on file   Physical Activity: Inactive    Days of Exercise per Week: 0 days    Minutes of Exercise per Session: 0 min   Stress: Not on file   Social Connections: Not on file   Intimate Partner Violence: Not on file   Housing Stability: Not on file            ROS:   Review of Systems - General ROS: negative  Psychological ROS: negative  Ophthalmic ROS: negative  ENT ROS: negative  Respiratory ROS: no cough, shortness of breath, or wheezing  Cardiovascular ROS: no chest pain or dyspnea on exertion  Gastrointestinal ROS: per HPI  Genito-Urinary ROS: no dysuria, trouble voiding, or hematuria  Musculoskeletal ROS: negative  Dermatological ROS: negative        Objective       Vitals:     07/21/22 1352   BP: 100/64   Pulse: 78   Temp: 98 °F (36.7 °C)      General:in no apparent distress and well developed and well nourished  Eyes: No gross abnormalities. Ears, Nose, Throat: hearing grossly normal bilaterally  Neck: neck supple and non tender without mass  Lungs: clear to auscultation without wheezes or rales   Heart: S1S2, no mumurs, RRR  Abdomen: Soft, nondistended, nontender to palpation, bowel sounds present. Patient does have a noticeable hernia in the left groin that occurs with Valsalva but is reducible. No noticeable defect or bulge in the right groin. In the upper abdomen in the right upper quadrant below costal margin in the area of the patient concern I do not palpate any defect in the abdominal wall in this area. No pain with palpation. No pain with palpation of the costal margin. Extremity: negative  Neuro: CN II-XII grossly intact        Assessment      1. Left inguinal hernia-reducible  2 abdominal wall bulge and right upper quadrant        Plan      1. I will obtain ultrasound of the abdominal wall in the area of concern at the right upper quadrant to evaluate for any possible small hernia defect. Do not appreciate any abnormalities on exam.  2.  We will plan for left inguinal hernia possible bilateral.  We will plan for robotic assisted laparoscopic hernia repair. Risks of the procedure including bleeding, infection, scarring, pain, damage surrounding structures including spermatic cord structures, potential loss of testicle, recurrence, mesh complications and anesthesia risk are discussed and consent is obtained. .  3.  Preop testing to include EKG and chest x-ray.      Electronically signed by Marcos Raines DO on 7/21/2022 at 4:36 PM        (Please note that portions of this note were completed with a voice recognition program.  Efforts were made to edit the dictations but occasionally words are mis-transcribed.)    The patient was interviewed and examined and there have been no changes since the above History and Physical.    Electronically signed by Scott Delgado DO on 9/15/2022 at 8:47 PM

## 2022-09-16 NOTE — OP NOTE
[facility] [address]                 [address] [place] [address]                                OPERATIVE REPORT    PATIENT NAME: Angel Luis Bro                   :        1955  MED REC NO:   [id number]                             ROOM:  ACCOUNT NO:   [de-identified]                           ADMIT DATE: 2022  PROVIDER:     Marjorie Garay    DATE OF PROCEDURE:  2022    SURGEON:  Dr. Marjorie Garay. ASSISTANT:  None. PREOPERATIVE DIAGNOSIS:  Left inguinal hernia. POSTOPERATIVE DIAGNOSIS:  Left inguinal hernia. PROCEDURE:  Robotic-assisted laparoscopic left inguinal hernia repair  with mesh. ANESTHESIA:  General.    ESTIMATED BLOOD LOSS:  10 mL. FLUIDS:  1700 mL of crystalloid. COMPLICATIONS:  None. SPECIMENS:  None. INDICATIONS FOR PROCEDURE:  The patient is a 58-year-old gentleman who  is referred to my office for complaint of several-month history of groin  pain and bulge in the left groin. After evaluation in the office, it  was noted that he did have a left inguinal hernia. We discussed the  options and he did want to proceed with surgical repair. Prior to the  time of the procedure, risks, benefits, and alternatives were explained  to the patient and consent was obtained. DESCRIPTION OF PROCEDURE:  The patient was brought to the operative  suite and placed on the operative table in the supine position. Monitoring devices and SCD cuffs were placed. General endotracheal  anesthesia was induced. After induction of anesthesia, time-out was  performed and correct patient and procedure were verified. The  patient's abdomen was prepped and draped in the sterile fashion. Prior  to the time of incision, the patient received preoperative antibiotics  in accordance with SCIP protocol. The skin in the left upper quadrant  at Weiss's point was anesthetized with local anesthetic and a small  transverse incision was made. Veress needle was advanced into the  abdomen. Saline drop test was performed which showed no aspiration of  blood or enteric fluid and free flow of saline. Insufflation tubing was  then connected and the patient's abdomen was insufflated to 12 mmHg. Once done, the Veress needle was removed and an 8 mm robotic trocar with  laparoscope in place was advanced into the abdomen in an Optiview  fashion. Once in, inspection of underlying structures showed no damage  during the entrance. Inspection in the lower abdomen at this point did  show an indirect left inguinal hernia. The right side was inspected and  there was no hernia present. Under visualization with laparoscope, two  additional robotic trocars were placed just to the left of midline and  in right upper quadrant. At this point, the robot was docked and  targeted to the left lower abdomen. Working instruments of a Force  bipolar and hot scissors were placed. The patient was positioned  slightly head down at this point. I then scrubbed out of the case and  went to the robotic console and created a peritoneal window at  approximately the level of the ASIS from just lateral to the median  umbilical fold out to the level of the ASIS. I began to dissect down  the peritoneum using the combination of sharp dissection with hot  scissors as well as some blunt dissection to take down the peritoneum  and to create the peritoneal window. I worked medially first and  dissected all the way down to the level of the pubic bone and I  continued my dissection approximately 2 to 3 cm below Eric's ligament  so that we would have good overlap of the mesh. We did make sure to go  ahead and dissect across midline at this point. Once I had that  dissection complete, I turned my attention to the lateral dissection and  began to take down the peritoneum laterally and to reduce the hernia  sac.   During this time, the spermatic cord structures were identified  and preserved. Once I had the sac completely reduced, I continued my  dissection posteriorly to create space for good mesh placement and to  completely occlude the myopectineal orifice. Inspection showed there  was no cord lipoma present. Once we had the dissection completed, a 10  x 15 laparoscopic ProGrip mesh was brought into the abdomen along with a  3-0 absorbable V-Loc suture and a 3-0 Vicryl suture cut to 6 inches. The mesh was placed into the preperitoneal space and unfolded to  completely cover the myopectineal orifice. Once we had that completed,  I went ahead and closed the peritoneal window using the 3-0 V-Loc in a  running fashion. With the peritoneal window closed, there was a small  peritoneal rent that I closed with the 3-0 Vicryl suture. Prior to  doing this, I did have the insufflation pressure dropped to 4 mmHg and  expressed as much CO2 out of the preperitoneal space as possible and  then closed the defect and increased the gas back up. This did bring  the peritoneum into fairly close approximation to the mesh and there did  not appear to be any folding or clam-shelling of the mesh. With the  peritoneum closed, final inspection of the abdomen showed no obvious  injuries to other structures. Excess suture and needles were removed. At this point, the robotic instruments were removed and the robot was  undocked. I scrubbed back into the case and uncapped the trocars to  allow evacuation of CO2 from the abdomen and then the trocars were  removed. Port sites were anesthetized with additional local anesthetic  and closed at skin level with 4-0 Monocryl in a subcuticular fashion. The patient's abdomen was then cleansed and dried and skin glue was  placed across all incisions. At the conclusion of the procedure, all  sponge, instrument, and needle counts were correct. The patient was  awoken from anesthesia and taken to the PACU in stable condition.         Daina Larose    D: 09/16/2022 8:58:47       T: 09/16/2022 9:02:43     [id number]  Job#: [id number]     Doc#: [id number]    CC:  Primary Care

## 2022-09-16 NOTE — DISCHARGE INSTRUCTIONS
Patient Discharge Instructions  Discharge Date:  09/16/22       Discharged To: Home    Home with Home Health Care: No    RESUME ACTIVITY:      BATHING: Ok to shower starting the day after surgery. No tub baths or submerging in water until after follow up in office. DRIVING: No driving for 1week  or while taking narcotic pain medications    RETURN TO WORK: [employer] to return as tolerated 1 week following surgery with the following restrictions:  No lifting more than 10 pounds  The above restrictions are in effect for 6 week(s)    WALKING:  Yes    STAIRS:  Yes    LIFTING: Less than 10 pounds for 6weeks     DIET: common adult    SPECIAL INSTRUCTIONS:     Call the office at [phone] if you have a fever > 100 F, or if your incision becomes red, tender, or drains more than a small amount of clear fluid. Call for follow up appointment with Dr. Bishop Leal in:  1-2weeks    May use ibuprofen, if able, for additional pain control. Use up to 400mg every 6 hours as needed. Ok to use ice packs to incisions for comfort. Use 15 minutes on, 30 minutes off and repeat as desired. 18

## 2022-09-16 NOTE — ANESTHESIA POSTPROCEDURE EVALUATION
Department of Anesthesiology  Postprocedure Note    Patient: Keanu Alexis  MRN: [id number]  [place]: 1955  Date of evaluation: 9/16/2022      Procedure Summary     Date: 09/16/22 Room / Location: [address] [place] [address]    Anesthesia Start: 6339 Anesthesia Stop: 0553    Procedure: Laparoscopic Robotic Assisted Left Inguinal Hernia Repair w/ mesh-130 (Left) Diagnosis:       Left inguinal hernia      (Left inguinal hernia [K40.90])    Surgeons: Magno Hadley DO Responsible Provider:     Anesthesia Type: general ASA Status: 1          Anesthesia Type: No value filed.     Duane Phase I: Duane Score: 7    Duane Phase II:        Anesthesia Post Evaluation    Patient location during evaluation: PACU  Patient participation: complete - patient participated  Level of consciousness: awake and alert  Pain score: 0  Airway patency: patent  Nausea & Vomiting: no nausea  Complications: no  Cardiovascular status: blood pressure returned to baseline and hemodynamically stable  Respiratory status: acceptable  Hydration status: euvolemic

## 2022-09-16 NOTE — ANESTHESIA PRE PROCEDURE
Department of Anesthesiology  Preprocedure Note       Name:  Gabby Estrada   Age:  69 y.o.  :  1955                                          MRN:  [id number]         Date:  2022      Surgeon: Lonna Frankel):  Francisca Reynolds DO    Procedure: Procedure(s):  Laparoscopic Robotic Assisted Left Inguinal Hernia Repair w/ mesh-130    Medications prior to admission:   Prior to Admission medications    Medication Sig Start Date End Date Taking? Authorizing Provider   tadalafil (CIALIS) 5 MG tablet Take 1 tablet by mouth in the morning. 22   Ricky Byrnes MD       Current medications:    Current Facility-Administered Medications   Medication Dose Route Frequency Provider Last Rate Last Admin    lactated ringers infusion   IntraVENous Continuous Francisca Reynolds  mL/hr at 22 0702 New Bag at 22 0702    sodium chloride flush 0.9 % injection 5-40 mL  5-40 mL IntraVENous 2 times per day Francisca Reynolds DO        sodium chloride flush 0.9 % injection 5-40 mL  5-40 mL IntraVENous PRN Francisca Reynolds DO        0.9 % sodium chloride infusion   IntraVENous PRN Francisca Reynolds DO        ceFAZolin (ANCEF) 2000 mg in sterile water 20 mL IV syringe  2,000 mg IntraVENous On Call to [address] Son DO           Allergies: Allergies   Allergen Reactions    Silodosin Other (See Comments)     confusion    Tamsulosin      Confusion       Problem List:    Patient Active Problem List   Diagnosis Code    Left inguinal hernia K40.90    Benign prostatic hyperplasia with urinary hesitancy N40.1, R39.11    Ganglion cyst of finger of right hand M67.441       Past Medical History:        Diagnosis Date    Foot fracture 2016    left 5th Metatarsal fx- Dr. Vanice Goldmann    History of pneumonia     Multiple fractures 2010    pelvis, sacrum, left ribs- due to 17 foot fall ( TV antenna tower)       Past Surgical History:  History reviewed.  No pertinent surgical history. Social History:    Social History     Tobacco Use    Smoking status: Never    Smokeless tobacco: Never   Substance Use Topics    Alcohol use: Not on file                                Counseling given: Not Answered      Vital Signs (Current):   Vitals:    09/16/22 0641   BP: 137/77   Pulse: 51   Resp: 12   Temp: 36.2 °C (97.2 °F)   TempSrc: Tympanic   SpO2: 96%   Weight: 163 lb (73.9 kg)   Height: 5' 7\" (1.702 m)                                              BP Readings from Last 3 Encounters:   09/16/22 137/77   07/21/22 100/64   07/19/22 139/68       NPO Status: Time of last liquid consumption: 2100                        Time of last solid consumption: 2100                        Date of last liquid consumption: 09/15/22                        Date of last solid food consumption: 09/15/22    BMI:   Wt Readings from Last 3 Encounters:   09/16/22 163 lb (73.9 kg)   07/21/22 172 lb 9.6 oz (78.3 kg)   07/19/22 171 lb (77.6 kg)     Body mass index is 25.53 kg/m². CBC: No results found for: WBC, RBC, HGB, HCT, MCV, RDW, PLT    CMP:   Lab Results   Component Value Date/Time     07/12/2022 08:01 AM    K 3.9 07/12/2022 08:01 AM     07/12/2022 08:01 AM    CO2 24 07/12/2022 08:01 AM    BUN 18 07/12/2022 08:01 AM    CREATININE 0.87 07/12/2022 08:01 AM    GFRAA >60 07/12/2022 08:01 AM    LABGLOM >60 07/12/2022 08:01 AM    GLUCOSE 100 07/12/2022 08:01 AM    CALCIUM 9.1 07/12/2022 08:01 AM       POC Tests: No results for input(s): POCGLU, POCNA, POCK, POCCL, POCBUN, POCHEMO, POCHCT in the last 72 hours.     Coags: No results found for: PROTIME, INR, APTT    HCG (If Applicable): No results found for: PREGTESTUR, PREGSERUM, HCG, HCGQUANT     ABGs: No results found for: PHART, PO2ART, HPL2ZSR, ZHW1CNO, BEART, X5QMUNXC     Type & Screen (If Applicable):  No results found for: LABABO, LABRH    Drug/Infectious Status (If Applicable):  No results found for: HIV, HEPCAB    COVID-19 Screening (If Applicable): No results found for: COVID19        Anesthesia Evaluation  Patient summary reviewed and Nursing notes reviewed no history of anesthetic complications:   Airway: Mallampati: II  TM distance: >3 FB   Neck ROM: full  Mouth opening: > = 3 FB   Dental: normal exam         Pulmonary:Negative Pulmonary ROS and normal exam                               Cardiovascular:Negative CV ROS          ECG reviewed                        Neuro/Psych:   Negative Neuro/Psych ROS              GI/Hepatic/Renal: Neg GI/Hepatic/Renal ROS            Endo/Other: Negative Endo/Other ROS                    Abdominal:             Vascular: negative vascular ROS. Other Findings:           Anesthesia Plan      general     ASA 1       Induction: intravenous. MIPS: Postoperative opioids intended and Prophylactic antiemetics administered. Anesthetic plan and risks discussed with patient. Use of blood products discussed with patient whom.    Plan discussed with surgical team.                    SYL St - CRNA   9/16/2022

## 2022-09-19 ENCOUNTER — APPOINTMENT (OUTPATIENT)
Dept: CT IMAGING | Age: 67
End: 2022-09-19
Payer: MEDICARE

## 2022-09-19 ENCOUNTER — TELEPHONE (OUTPATIENT)
Dept: SURGERY | Age: 67
End: 2022-09-19

## 2022-09-19 ENCOUNTER — HOSPITAL ENCOUNTER (EMERGENCY)
Age: 67
Discharge: HOME | End: 2022-09-19
Attending: EMERGENCY MEDICINE
Payer: MEDICARE

## 2022-09-19 VITALS
TEMPERATURE: 97.7 F | HEART RATE: 67 BPM | DIASTOLIC BLOOD PRESSURE: 91 MMHG | RESPIRATION RATE: 14 BRPM | SYSTOLIC BLOOD PRESSURE: 152 MMHG | OXYGEN SATURATION: 97 %

## 2022-09-19 DIAGNOSIS — R33.8 ACUTE URINARY RETENTION: Primary | ICD-10-CM

## 2022-09-19 LAB
ABSOLUTE EOS #: 0.15 K/UL (ref 0–0.44)
ABSOLUTE IMMATURE GRANULOCYTE: <0.03 K/UL (ref 0–0.3)
ABSOLUTE LYMPH #: 0.99 K/UL (ref 1.1–3.7)
ABSOLUTE MONO #: 0.65 K/UL (ref 0.1–1.2)
ALBUMIN SERPL-MCNC: 4 G/DL (ref 3.5–5.2)
ALBUMIN/GLOBULIN RATIO: 1.5 (ref 1–2.5)
ALP BLD-CCNC: 59 U/L (ref 40–129)
ALT SERPL-CCNC: 12 U/L (ref 5–41)
ANION GAP SERPL CALCULATED.3IONS-SCNC: 10 MMOL/L (ref 9–17)
AST SERPL-CCNC: 16 U/L
BACTERIA: NORMAL
BASOPHILS # BLD: 1 % (ref 0–2)
BASOPHILS ABSOLUTE: 0.03 K/UL (ref 0–0.2)
BILIRUB SERPL-MCNC: 0.5 MG/DL (ref 0.3–1.2)
BILIRUBIN DIRECT: 0.1 MG/DL
BILIRUBIN URINE: NEGATIVE
BILIRUBIN, INDIRECT: 0.4 MG/DL (ref 0–1)
BUN BLDV-MCNC: 18 MG/DL (ref 8–23)
BUN/CREAT BLD: 23 (ref 9–20)
CALCIUM SERPL-MCNC: 9 MG/DL (ref 8.6–10.4)
CHLORIDE BLD-SCNC: 106 MMOL/L (ref 98–107)
CO2: 26 MMOL/L (ref 20–31)
CREAT SERPL-MCNC: 0.78 MG/DL (ref 0.7–1.2)
EOSINOPHILS RELATIVE PERCENT: 2 % (ref 1–4)
EPITHELIAL CELLS UA: NORMAL /HPF (ref 0–5)
GFR AFRICAN AMERICAN: >60 ML/MIN
GFR NON-AFRICAN AMERICAN: >60 ML/MIN
GFR SERPL CREATININE-BSD FRML MDRD: ABNORMAL ML/MIN/{1.73_M2}
GLOBULIN: 2.6 G/DL (ref 1.5–3.8)
GLUCOSE BLD-MCNC: 95 MG/DL (ref 70–99)
GLUCOSE URINE: NEGATIVE
HCT VFR BLD CALC: 38.4 % (ref 40.7–50.3)
HEMOGLOBIN: 13.4 G/DL (ref 13–17)
IMMATURE GRANULOCYTES: 0 %
KETONES, URINE: NEGATIVE
LACTIC ACID: 1.1 MMOL/L (ref 0.5–2.2)
LEUKOCYTE ESTERASE, URINE: NEGATIVE
LIPASE: 26 U/L (ref 13–60)
LYMPHOCYTES # BLD: 16 % (ref 24–43)
MCH RBC QN AUTO: 32.8 PG (ref 25.2–33.5)
MCHC RBC AUTO-ENTMCNC: 34.9 G/DL (ref 25.2–33.5)
MCV RBC AUTO: 93.9 FL (ref 82.6–102.9)
MONOCYTES # BLD: 10 % (ref 3–12)
NITRITE, URINE: NEGATIVE
NRBC AUTOMATED: 0 PER 100 WBC
PDW BLD-RTO: 11.7 % (ref 11.8–14.4)
PH UA: 6.5 (ref 5–6)
PLATELET # BLD: 169 K/UL (ref 138–453)
PMV BLD AUTO: 10 FL (ref 8.1–13.5)
POTASSIUM SERPL-SCNC: 4.2 MMOL/L (ref 3.7–5.3)
PROTEIN UA: NEGATIVE
RBC # BLD: 4.09 M/UL (ref 4.21–5.77)
RBC UA: NORMAL /HPF (ref 0–4)
SEG NEUTROPHILS: 71 % (ref 36–65)
SEGMENTED NEUTROPHILS ABSOLUTE COUNT: 4.45 K/UL (ref 1.5–8.1)
SODIUM BLD-SCNC: 142 MMOL/L (ref 135–144)
SPECIFIC GRAVITY UA: 1.02 (ref 1.01–1.02)
TOTAL PROTEIN: 6.6 G/DL (ref 6.4–8.3)
TROPONIN, HIGH SENSITIVITY: 6 NG/L (ref 0–22)
URINE HGB: NEGATIVE
UROBILINOGEN, URINE: NORMAL
WBC # BLD: 6.3 K/UL (ref 3.5–11.3)
WBC UA: NORMAL /HPF (ref 0–4)

## 2022-09-19 PROCEDURE — 99285 EMERGENCY DEPT VISIT HI MDM: CPT

## 2022-09-19 PROCEDURE — 2709999900 CT ABDOMEN PELVIS W IV CONTRAST

## 2022-09-19 PROCEDURE — 6370000000 HC RX 637 (ALT 250 FOR IP)

## 2022-09-19 PROCEDURE — 83690 ASSAY OF LIPASE: CPT

## 2022-09-19 PROCEDURE — 85025 COMPLETE CBC W/AUTO DIFF WBC: CPT

## 2022-09-19 PROCEDURE — 81001 URINALYSIS AUTO W/SCOPE: CPT

## 2022-09-19 PROCEDURE — 51702 INSERT TEMP BLADDER CATH: CPT

## 2022-09-19 PROCEDURE — 80048 BASIC METABOLIC PNL TOTAL CA: CPT

## 2022-09-19 PROCEDURE — 6360000004 HC RX CONTRAST MEDICATION: Performed by: EMERGENCY MEDICINE

## 2022-09-19 PROCEDURE — 93005 ELECTROCARDIOGRAM TRACING: CPT | Performed by: EMERGENCY MEDICINE

## 2022-09-19 PROCEDURE — 80076 HEPATIC FUNCTION PANEL: CPT

## 2022-09-19 PROCEDURE — 6370000000 HC RX 637 (ALT 250 FOR IP): Performed by: EMERGENCY MEDICINE

## 2022-09-19 PROCEDURE — 2580000003 HC RX 258: Performed by: EMERGENCY MEDICINE

## 2022-09-19 PROCEDURE — 84484 ASSAY OF TROPONIN QUANT: CPT

## 2022-09-19 PROCEDURE — 83605 ASSAY OF LACTIC ACID: CPT

## 2022-09-19 RX ORDER — CEPHALEXIN 250 MG/1
CAPSULE ORAL
Status: DISCONTINUED
Start: 2022-09-19 | End: 2022-09-19 | Stop reason: HOSPADM

## 2022-09-19 RX ORDER — LIDOCAINE HYDROCHLORIDE 20 MG/ML
JELLY TOPICAL
Status: COMPLETED
Start: 2022-09-19 | End: 2022-09-19

## 2022-09-19 RX ORDER — CEPHALEXIN 250 MG/1
500 CAPSULE ORAL ONCE
Status: COMPLETED | OUTPATIENT
Start: 2022-09-19 | End: 2022-09-19

## 2022-09-19 RX ORDER — 0.9 % SODIUM CHLORIDE 0.9 %
1000 INTRAVENOUS SOLUTION INTRAVENOUS ONCE
Status: COMPLETED | OUTPATIENT
Start: 2022-09-19 | End: 2022-09-19

## 2022-09-19 RX ORDER — LIDOCAINE HYDROCHLORIDE 20 MG/ML
JELLY TOPICAL PRN
Status: DISCONTINUED | OUTPATIENT
Start: 2022-09-19 | End: 2022-09-19 | Stop reason: HOSPADM

## 2022-09-19 RX ORDER — CEPHALEXIN 500 MG/1
500 CAPSULE ORAL 4 TIMES DAILY
Qty: 28 CAPSULE | Refills: 0 | Status: SHIPPED | OUTPATIENT
Start: 2022-09-19 | End: 2022-09-26

## 2022-09-19 RX ADMIN — IOPAMIDOL 100 ML: 755 INJECTION, SOLUTION INTRAVENOUS at 18:00

## 2022-09-19 RX ADMIN — SODIUM CHLORIDE 1000 ML: 9 INJECTION, SOLUTION INTRAVENOUS at 18:32

## 2022-09-19 RX ADMIN — LIDOCAINE HYDROCHLORIDE: 20 JELLY TOPICAL at 19:23

## 2022-09-19 RX ADMIN — CEPHALEXIN 500 MG: 250 CAPSULE ORAL at 20:21

## 2022-09-19 ASSESSMENT — ENCOUNTER SYMPTOMS: ABDOMINAL PAIN: 1

## 2022-09-19 ASSESSMENT — PAIN SCALES - GENERAL: PAINLEVEL_OUTOF10: 3

## 2022-09-19 ASSESSMENT — PAIN - FUNCTIONAL ASSESSMENT: PAIN_FUNCTIONAL_ASSESSMENT: 0-10

## 2022-09-19 ASSESSMENT — PAIN DESCRIPTION - DESCRIPTORS: DESCRIPTORS: ACHING

## 2022-09-19 ASSESSMENT — PAIN DESCRIPTION - LOCATION: LOCATION: ABDOMEN

## 2022-09-19 NOTE — DISCHARGE INSTRUCTIONS
Barclay catheter for comfort and support return to the ER for increasing pain fever vomiting or other concerns otherwise you are to follow-up with your family doctor or with your surgeon within 24 to 50 hours for Barclay catheter removal  Please understand that at this time there is no evidence for a more serious underlying process, but that early in the process of an illness or injury, an emergency department workup can be falsely reassuring. You should contact your family doctor within the next 48 hours for a follow up appointment    Gagan Howard!!!    From [facility]) and [facility] Emergency Services    On behalf of the Emergency Department staff at [facility]), I would like to thank you for giving us the opportunity to address your health care needs and concerns. We hope that during your visit, our service was delivered in a professional and caring manner. Please keep [facility]) in mind as we walk with you down the path to your own personal wellness. Please expect an automated text message or email from us so we can ask a few questions about your health and progress. Based on your answers, a clinician may call you back to offer help and instructions. Please understand that early in the process of an illness or injury, an emergency department workup can be falsely reassuring. If you notice any worsening, changing or persistent symptoms please call your family doctor or return to the ER immediately. Tell us how we did during your visit at [email or web address]   and let us know about your experience

## 2022-09-19 NOTE — ED PROVIDER NOTES
[facility] ED  [address]  [place] [address]  Phone: [phone]        Pt Name: Zachary Cohen  MRN: [id number]  [place] 1955  Date of evaluation: 9/19/22      CHIEF COMPLAINT     Chief Complaint   Patient presents with    Dysuria     Difficulty initiating urine streama nd having urinary frequency         HISTORY OF PRESENT ILLNESS  (Location/Symptom, Timing/Onset, Context/Setting, Quality, Duration, Modifying Factors, Severity.)    Zachary Cohen is a 69 y.o. male who presents with abdominal pain chest pain dysuria. The patient dates on Friday he had a hernia repair over the weekend he had chest pain abdominal pain dysuria the chest pain is resolving he still has abdominal discomfort and dysuria so when he talked to his surgeon he was advised to come to the ER for evaluation no fever no chills states he is constipated as he has been taking pain medications no vomiting      REVIEW OF SYSTEMS    (2-9 systems for level 4, 10 or more for level 5)     Review of Systems   Cardiovascular:  Positive for chest pain. Gastrointestinal:  Positive for abdominal pain. Genitourinary:  Positive for dysuria. All other systems reviewed and are negative. PAST MEDICAL HISTORY    has a past medical history of Foot fracture, History of pneumonia, and Multiple fractures. SURGICAL HISTORY      has a past surgical history that includes hernia repair (Left, 9/16/2022). CURRENTMEDICATIONS       Previous Medications    HYDROCODONE-ACETAMINOPHEN (NORCO) 5-325 MG PER TABLET    Take 1 tablet by mouth every 6 hours as needed for Pain for up to 5 days. Intended supply: 5 days. Take lowest dose possible to manage pain    TADALAFIL (CIALIS) 5 MG TABLET    Take 1 tablet by mouth in the morning. ALLERGIES     is allergic to silodosin and tamsulosin. FAMILY HISTORY     has no family status information on file. family history is not on file.     SOCIAL HISTORY      reports that he has none  Conduction: First-degree AV block  ST Segments: no acute change  T Waves: no acute change  Q Waves: none    Clinical Impression: normal sinus rhythm with no acute changes/normal EKG. No acute infarction/ischemia noted. RADIOLOGY:        Interpretation per the Radiologist below, if available at the time of this note:    CT ABDOMEN PELVIS W IV CONTRAST Additional Contrast? None    Result Date: 9/19/2022  EXAMINATION: CT OF THE ABDOMEN AND PELVIS WITH CONTRAST 9/19/2022 5:56 pm TECHNIQUE: CT of the abdomen and pelvis was performed with the administration of intravenous contrast. Multiplanar reformatted images are provided for review. Automated exposure control, iterative reconstruction, and/or weight based adjustment of the mA/kV was utilized to reduce the radiation dose to as low as reasonably achievable. COMPARISON: None. HISTORY: ORDERING SYSTEM PROVIDED HISTORY: pain TECHNOLOGIST PROVIDED HISTORY: pain Decision Support Exception - unselect if not a suspected or confirmed emergency medical condition->Emergency Medical Condition (MA) Reason for Exam:  Difficulty urinating; lower abd pain; constipation; recent hernia repair FINDINGS: Lower Chest: Unremarkable. Organs: Hepatic cysts and punctate hypoattenuating lesions are too small to characterize but likely represent additional cysts. Gallbladder is unremarkable. No biliary ductal dilatation. Pancreas, adrenals, kidneys, spleen are unremarkable. Mild calcific atherosclerosis. GI/Bowel: Small hiatal hernia. Bowel is nondilated without wall thickening. Appendix is normal. Pelvis: Mild bladder wall thickening and trabeculation. Moderate prostatomegaly. Peritoneum/Retroperitoneum: Trace pneumoperitoneum. No free fluid, organized fluid collection, lymphadenopathy. Bones/Soft Tissues: Subcutaneous emphysema. No acute bony abnormality. 1. Trace pneumoperitoneum and subcutaneous emphysema, likely related to reported recent hernia repair.   No other acute abnormality. 2. Moderate prostatomegaly with evidence of chronic outlet obstruction.  RECOMMENDATIONS:       LABS:  Results for orders placed or performed during the hospital encounter of 09/19/22   Urinalysis with Reflex to Culture    Specimen: Urine   Result Value Ref Range    Glucose, Ur NEGATIVE NEGATIVE    Bilirubin Urine NEGATIVE NEGATIVE    Ketones, Urine NEGATIVE NEGATIVE    Specific Gravity, UA 1.020 1.010 - 1.025    Urine Hgb NEGATIVE NEGATIVE    pH, UA 6.5 (H) 5.0 - 6.0    Protein, UA NEGATIVE NEGATIVE    Urobilinogen, Urine Normal Normal    Nitrite, Urine NEGATIVE NEGATIVE    Leukocyte Esterase, Urine NEGATIVE NEGATIVE   Basic Metabolic Panel   Result Value Ref Range    Glucose 95 70 - 99 mg/dL    BUN 18 8 - 23 mg/dL    Creatinine 0.78 0.70 - 1.20 mg/dL    Bun/Cre Ratio 23 (H) 9 - 20    Calcium 9.0 8.6 - 10.4 mg/dL    Sodium 142 135 - 144 mmol/L    Potassium 4.2 3.7 - 5.3 mmol/L    Chloride 106 98 - 107 mmol/L    CO2 26 20 - 31 mmol/L    Anion Gap 10 9 - 17 mmol/L    GFR Non-African American >60 >60 mL/min    GFR African American >60 >60 mL/min    GFR Comment         CBC with Auto Differential   Result Value Ref Range    WBC 6.3 3.5 - 11.3 k/uL    RBC 4.09 (L) 4.21 - 5.77 m/uL    Hemoglobin 13.4 13.0 - 17.0 g/dL    Hematocrit 38.4 (L) 40.7 - 50.3 %    MCV 93.9 82.6 - 102.9 fL    MCH 32.8 25.2 - 33.5 pg    MCHC 34.9 (H) 25.2 - 33.5 g/dL    RDW 11.7 (L) 11.8 - 14.4 %    Platelets 775 037 - 067 k/uL    MPV 10.0 8.1 - 13.5 fL    NRBC Automated 0.0 0.0 per 100 WBC    Seg Neutrophils 71 (H) 36 - 65 %    Lymphocytes 16 (L) 24 - 43 %    Monocytes 10 3 - 12 %    Eosinophils % 2 1 - 4 %    Basophils 1 0 - 2 %    Immature Granulocytes 0 0 %    Segs Absolute 4.45 1.50 - 8.10 k/uL    Absolute Lymph # 0.99 (L) 1.10 - 3.70 k/uL    Absolute Mono # 0.65 0.10 - 1.20 k/uL    Absolute Eos # 0.15 0.00 - 0.44 k/uL    Basophils Absolute 0.03 0.00 - 0.20 k/uL    Absolute Immature Granulocyte <0.03 0.00 - 0.30 k/uL or ULCER, thus I consider the discharge disposition reasonable. Re-evaluation of the abdomen is benign. No guarding, peritoneal signs or significant tenderness noted. Also, there is no evidence or peritonitis, sepsis, or toxicity. The patient and/or family and I have discussed the diagnosis and risks, and we agree with discharging home to follow-up with their primary doctor. The patient presents with abdominal pain without signs of peritonitis or other life-threatening or serious etiology. The patient appears stable for discharge and has been instructed to return immediately if the symptoms worsen in any way, or in 8-12 hr if not improved for re-evaluation. We also discussed returning to the Emergency Department immediately if new or worsening symptoms occur. We have discussed the symptoms which are most concerning (e.g., bloody stool, fever, changing or worsening pain, persistent vomiting, chest pain shortness of breath, numbness or weakness to the arms or legs, coolness or color change to the arms or legs) that necessitate immediate return. The patient understands that at this time there is no evidence for a more malignant underlying process, but the patient also understands that early in the process of an illness or injury, an emergency department workup can be falsely reassuring. Routine discharge counseling was given, and the patient understands that worsening, changing or persistent symptoms should prompt an immediate call or follow up with their primary physician or return to the emergency department. The importance of appropriate follow up was also discussed. I have reviewed the disposition diagnosis with the patient and or their family/guardian. I have answered their questions and given discharge instructions. They voiced understanding of these instructions and did not have any further questions or complaints.      PROCEDURES:  Patient has a Barclay catheter placed with approximately 1000 cc of urine obtained he feels much better    FINAL IMPRESSION      1. Acute urinary retention          DISPOSITION/PLAN   DISPOSITION Decision To Discharge 09/19/2022 07:51:08 PM      CONDITION ON DISPOSITION:   Improved - Stable    PATIENT REFERRED TO:  Lokesh Pringle MD  [address] [place] [address]  [phone]    Call in 1 day      DISCHARGE MEDICATIONS:  New Prescriptions    CEPHALEXIN (KEFLEX) 500 MG CAPSULE    Take 1 capsule by mouth 4 times daily for 7 days       (Please note that portions of this note were completed with a voicerecognition program.  Efforts were made to edit the dictations but occasionally words are mis-transcribed.)    Allison Flores MD,, MD, F.A.C.E.P.   Attending Emergency Medicine Physician       Allison Flores MD  09/19/22 [id number]

## 2022-09-20 ENCOUNTER — TELEPHONE (OUTPATIENT)
Dept: FAMILY MEDICINE CLINIC | Age: 67
End: 2022-09-20

## 2022-09-20 DIAGNOSIS — R33.9 URINARY RETENTION: Primary | ICD-10-CM

## 2022-09-20 NOTE — TELEPHONE ENCOUNTER
----- Message from [employer] sent at 9/20/2022  8:20 AM EDT -----  Subject: Appointment Request    Reason for Call: Established Patient Appointment needed: Routine ED Follow   Up Visit    QUESTIONS    Reason for appointment request? No appointments available during search     Additional Information for Provider? Patient needs to schedule ED follow   up this week.  Please call to schedule.   ---------------------------------------------------------------------------  --------------  Ky Fraction INFO  [phone]; OK to leave message on voicemail  ---------------------------------------------------------------------------  --------------  SCRIPT ANSWERS  COVID Screen: Javier Rooney

## 2022-09-20 NOTE — TELEPHONE ENCOUNTER
Patient was seen in [facility] ER for urinary retention. Patient had hernia surgery on 9/16/22 and has had difficulty urinating since then. He has been up every 30 minutes to attempt to urinate and is unable. He had received a call from Dr. Anna Billy office for f/u, and was consequently advised to go to ER. Had bassett cath placed and got 1200 ml immediately and 1800 mL throughout the night. Patient is not currently following with urology or surgery. Still has bassett catheter at this time. Requesting f/u with Dr. Lakia Viveros, but no appts available. Please advise.

## 2022-09-20 NOTE — TELEPHONE ENCOUNTER
Pt aware of recommendation to see Urology for cath direction and follow up. Pt transferred to schedule with urology,.

## 2022-09-20 NOTE — TELEPHONE ENCOUNTER
I put in a referral to urology. He needs to see them. I am happy to see him too, but urology will decide when to take out the catheter.

## 2022-09-21 LAB
EKG ATRIAL RATE: 60 BPM
EKG P AXIS: 8 DEGREES
EKG P-R INTERVAL: 210 MS
EKG Q-T INTERVAL: 392 MS
EKG QRS DURATION: 96 MS
EKG QTC CALCULATION (BAZETT): 392 MS
EKG R AXIS: -6 DEGREES
EKG T AXIS: 24 DEGREES
EKG VENTRICULAR RATE: 60 BPM

## 2022-09-22 ENCOUNTER — TELEPHONE (OUTPATIENT)
Dept: UROLOGY | Age: 67
End: 2022-09-22

## 2022-09-22 NOTE — TELEPHONE ENCOUNTER
Patient called the office stating he is having blood coming out of his cath bag. He states he is not having any discomfort or pain. He would like to know what he can do.  Please give him a call back at [phone]

## 2022-09-26 ENCOUNTER — OFFICE VISIT (OUTPATIENT)
Dept: UROLOGY | Age: 67
End: 2022-09-26
Payer: MEDICARE

## 2022-09-26 VITALS
HEIGHT: 67 IN | HEART RATE: 68 BPM | SYSTOLIC BLOOD PRESSURE: 114 MMHG | DIASTOLIC BLOOD PRESSURE: 72 MMHG | BODY MASS INDEX: 26.53 KG/M2 | WEIGHT: 169 LBS

## 2022-09-26 DIAGNOSIS — N52.9 ERECTILE DYSFUNCTION, UNSPECIFIED ERECTILE DYSFUNCTION TYPE: ICD-10-CM

## 2022-09-26 DIAGNOSIS — N40.1 BENIGN LOCALIZED PROSTATIC HYPERPLASIA WITH LOWER URINARY TRACT SYMPTOMS (LUTS): Primary | ICD-10-CM

## 2022-09-26 PROCEDURE — 99212 OFFICE O/P EST SF 10 MIN: CPT | Performed by: UROLOGY

## 2022-09-26 PROCEDURE — 99204 OFFICE O/P NEW MOD 45 MIN: CPT | Performed by: UROLOGY

## 2022-09-26 PROCEDURE — 1123F ACP DISCUSS/DSCN MKR DOCD: CPT | Performed by: UROLOGY

## 2022-09-26 NOTE — PROGRESS NOTES
Joy Durham MD.    [facility] [id number]  [address]  [address] [address]  Dept: [phone]  Dept Fax: [phone] [address] [facility] Urology Office Note -     Patient:  Brianna Rossi  YOB: 1955    The patient is a 69 y.o. male who presents today for evaluation of the following problems:   Chief Complaint   Patient presents with    Urinary Retention     Has been having urinary retention for many years. After having hernia surgery 9/16 he was been having more urinary retention. Currently has a cath in since 9/19/2022    referred/consultation requested by Chiqui Navarrete MD.    History of Present Illness:    BPH  Has had episodes in past  Worsening after hernia surgery but has had very bothersome LUTS prior as well  Has catheter in place  Normal range  Barclay for 1200cc  Allergic to flomax- bad SE profile. On cialis for BPH        Requested/reviewed records from Chiqui Navarrete MD office and/or outside physician/EMR    (Patient's old records have been requested, reviewed and pertinent findings summarized in today's note.)    Procedures Today: N/A      Last several PSA's:  Lab Results   Component Value Date    PSA 2.62 07/12/2022       Last total testosterone:  No results found for: TESTOSTERONE    Urinalysis today:  No results found for this visit on 09/26/22.     Last BUN and creatinine:  Lab Results   Component Value Date    BUN 18 09/19/2022     Lab Results   Component Value Date    CREATININE 0.78 09/19/2022         Imaging Reviewed during this Office Visit:   Farooq Parekh MD independently reviewed the images and verified the radiology reports from:    CT ABDOMEN PELVIS W IV CONTRAST Additional Contrast? None    Result Date: 9/19/2022  EXAMINATION: CT OF THE ABDOMEN AND PELVIS WITH CONTRAST 9/19/2022 5:56 pm TECHNIQUE: CT of the abdomen and pelvis was performed with the administration of intravenous contrast. Multiplanar reformatted images are provided for review. Automated exposure control, iterative reconstruction, and/or weight based adjustment of the mA/kV was utilized to reduce the radiation dose to as low as reasonably achievable. COMPARISON: None. HISTORY: ORDERING SYSTEM PROVIDED HISTORY: pain TECHNOLOGIST PROVIDED HISTORY: pain Decision Support Exception - unselect if not a suspected or confirmed emergency medical condition->Emergency Medical Condition (MA) Reason for Exam:  Difficulty urinating; lower abd pain; constipation; recent hernia repair FINDINGS: Lower Chest: Unremarkable. Organs: Hepatic cysts and punctate hypoattenuating lesions are too small to characterize but likely represent additional cysts. Gallbladder is unremarkable. No biliary ductal dilatation. Pancreas, adrenals, kidneys, spleen are unremarkable. Mild calcific atherosclerosis. GI/Bowel: Small hiatal hernia. Bowel is nondilated without wall thickening. Appendix is normal. Pelvis: Mild bladder wall thickening and trabeculation. Moderate prostatomegaly. Peritoneum/Retroperitoneum: Trace pneumoperitoneum. No free fluid, organized fluid collection, lymphadenopathy. Bones/Soft Tissues: Subcutaneous emphysema. No acute bony abnormality. 1. Trace pneumoperitoneum and subcutaneous emphysema, likely related to reported recent hernia repair. No other acute abnormality. 2. Moderate prostatomegaly with evidence of chronic outlet obstruction.  RECOMMENDATIONS:       PAST MEDICAL, FAMILY AND SOCIAL HISTORY:  Past Medical History:   Diagnosis Date    Foot fracture 11/21/2016    left 5th Metatarsal fx- Dr. Anton Araiza    History of pneumonia     Multiple fractures 06/06/2010    pelvis, sacrum, left ribs- due to 17 foot fall ( TV antenna tower)     Past Surgical History:   Procedure Laterality Date    HERNIA REPAIR Left 9/16/2022    Laparoscopic Robotic Assisted Left Inguinal Hernia Repair w/ mesh-130 performed by Nataliya Rod DO at [facility] OR     No family history on file. Outpatient Medications Marked as Taking for the 9/26/22 encounter (Office Visit) with Bishop Moe MD   Medication Sig Dispense Refill    cephALEXin (KEFLEX) 500 MG capsule Take 1 capsule by mouth 4 times daily for 7 days 28 capsule 0    tadalafil (CIALIS) 5 MG tablet Take 1 tablet by mouth in the morning. 90 tablet 1       Silodosin and Tamsulosin  Social History     Tobacco Use   Smoking Status Never   Smokeless Tobacco Never      (If patient a smoker, smoking cessation counseling offered)   Social History     Substance and Sexual Activity   Alcohol Use None       REVIEW OF SYSTEMS:  Constitutional: negative  Eyes: negative  Respiratory: negative  Cardiovascular: negative  Gastrointestinal: negative  Genitourinary: see HPI  Musculoskeletal: negative  Skin: negative   Neurological: negative  Hematological/Lymphatic: negative  Psychological: negative        Physical Exam:    This a 69 y.o. male  Vitals:    09/26/22 1010   BP: 114/72   Pulse: 68     Body mass index is 26.47 kg/m². Constitutional: Patient in no acute distress;       Assessment and Plan        1. Benign localized prostatic hyperplasia with lower urinary tract symptoms (LUTS)    2. Erectile dysfunction, unspecified erectile dysfunction type               Plan:      BPH with retention- cialis as he can't take flomax due to SE profile. has bassett. Discussed removal today, will remove. Cysto in 1-4 weeks for PALMER eval. Pt interested in urolift. Prescriptions Ordered:  No orders of the defined types were placed in this encounter. Orders Placed:  No orders of the defined types were placed in this encounter.            Charity Lopez MD

## 2022-09-27 ENCOUNTER — OFFICE VISIT (OUTPATIENT)
Dept: SURGERY | Age: 67
End: 2022-09-27
Payer: MEDICARE

## 2022-09-27 VITALS
HEIGHT: 67 IN | DIASTOLIC BLOOD PRESSURE: 62 MMHG | WEIGHT: 171 LBS | BODY MASS INDEX: 26.84 KG/M2 | HEART RATE: 60 BPM | OXYGEN SATURATION: 98 % | TEMPERATURE: 96.9 F | SYSTOLIC BLOOD PRESSURE: 106 MMHG

## 2022-09-27 DIAGNOSIS — Z09 POSTOP CHECK: Primary | ICD-10-CM

## 2022-09-27 PROCEDURE — 99211 OFF/OP EST MAY X REQ PHY/QHP: CPT

## 2022-09-27 PROCEDURE — 99024 POSTOP FOLLOW-UP VISIT: CPT | Performed by: SURGERY

## 2022-09-27 NOTE — ASSESSMENT & PLAN NOTE
Patient doing well after surgery. He did have some urinary retention. With history of prostatic hypertrophy I think that this may have been exacerbated some by surgery and because the retention. The retention seems to have resolved after removal of the Barclay. Does have further follow-up plans with urology. Discussed continued activity restrictions for full 6 weeks at which time he may return to activity as tolerated. Okay to submerge in water. Okay to drive. May return to work with activity restrictions. Will have patient follow-up with me as needed. Encouraged to call with any questions concerns or problems.

## 2022-09-27 NOTE — PROGRESS NOTES
Subjective   Mandie Sheppard is a 69 y.o. male who presents today for for follow-up after robotic assisted laparoscopic left inguinal hernia repair with mesh approximately a week and a half ago. Since surgery patient states that pain has been controlled and is no longer taking narcotic pain medication. He did have some urinary retention after surgery and ended up coming to the ER where he had a Barclay catheter placed. He did have a history of hypertrophic prostate prior to surgery and I think this was exacerbated by his surgery. He was seen by urology yesterday and had Barclay removed and has been able to urinate since that time. Being planned for scope with urology to further assess his prostate issues. Did have some constipation immediately after surgery but used stool softeners and is now having regular bowel function. Tolerating diet without any issues. Does report some incisional tenderness and also immediately after surgery had some subxiphoid tenderness as well as some tenderness to the right shoulder which seems to have resolved. Denies any significant pain in left groin. No concern for recurrence of hernia. Past Medical History:   Diagnosis Date    Foot fracture 11/21/2016    left 5th Metatarsal fx- Dr. Bush Apt    History of pneumonia     Multiple fractures 06/06/2010    pelvis, sacrum, left ribs- due to 17 foot fall ( TV antenna tower)       Past Surgical History:   Procedure Laterality Date    HERNIA REPAIR Left 9/16/2022    Laparoscopic Robotic Assisted Left Inguinal Hernia Repair w/ mesh-130 performed by Alexandria Oliveira DO at [facility] OR       Current Outpatient Medications   Medication Sig Dispense Refill    tadalafil (CIALIS) 5 MG tablet Take 1 tablet by mouth in the morning. 90 tablet 1     No current facility-administered medications for this visit.        Allergies   Allergen Reactions    Silodosin Other (See Comments)     confusion    Tamsulosin      Confusion History reviewed. No pertinent family history. Social History     Socioeconomic History    Marital status:      Spouse name: Not on file    Number of children: Not on file    Years of education: Not on file    Highest education level: Not on file   Occupational History    Not on file   Tobacco Use    Smoking status: Never    Smokeless tobacco: Never   Substance and Sexual Activity    Alcohol use: Not on file    Drug use: Not on file    Sexual activity: Not on file   Other Topics Concern    Not on file   Social History Narrative    Not on file     Social Determinants of Health     Financial Resource Strain: Unknown    Difficulty of Paying Living Expenses: Patient refused   Food Insecurity: Unknown    Worried About Running Out of Food in the Last Year: Patient refused    Ran Out of Food in the Last Year: Patient refused   Transportation Needs: Not on file   Physical Activity: Inactive    Days of Exercise per Week: 0 days    Minutes of Exercise per Session: 0 min   Stress: Not on file   Social Connections: Not on file   Intimate Partner Violence: Not on file   Housing Stability: Not on file       ROS:   Review of Systems - Negative except as noted in HPI      Objective   Vitals:    09/27/22 0821   BP: 106/62   Pulse: 60   Temp: 96.9 °F (36.1 °C)   SpO2: 98%     General:in no apparent distress and well developed and well nourished  Eyes: No gross abnormalities. Ears, Nose, Throat: hearing grossly normal bilaterally  Neck: neck supple and non tender without mass  Lungs: clear to auscultation without wheezes or rales   Heart: S1S2, no mumurs, RRR  Abdomen: Soft, nondistended, minimal tenderness palpation at left side incision but otherwise nontender, bowel sounds present. Incisions all intact with glue in place. Minimal ecchymosis at left incision but seems to be resolving. No evidence of hernia recurrence in left groin. Extremity: negative  Neuro: CN II-XII grossly intact      1.  Postop

## 2022-10-10 ENCOUNTER — PROCEDURE VISIT (OUTPATIENT)
Dept: UROLOGY | Age: 67
End: 2022-10-10
Payer: MEDICARE

## 2022-10-10 VITALS
DIASTOLIC BLOOD PRESSURE: 62 MMHG | BODY MASS INDEX: 26.84 KG/M2 | HEIGHT: 67 IN | SYSTOLIC BLOOD PRESSURE: 112 MMHG | WEIGHT: 171 LBS | HEART RATE: 51 BPM

## 2022-10-10 DIAGNOSIS — N40.1 BENIGN LOCALIZED PROSTATIC HYPERPLASIA WITH LOWER URINARY TRACT SYMPTOMS (LUTS): Primary | ICD-10-CM

## 2022-10-10 DIAGNOSIS — N52.9 ERECTILE DYSFUNCTION, UNSPECIFIED ERECTILE DYSFUNCTION TYPE: ICD-10-CM

## 2022-10-10 PROCEDURE — 52000 CYSTOURETHROSCOPY: CPT | Performed by: UROLOGY

## 2022-10-10 NOTE — PROGRESS NOTES
Cystoscopy    Operative Note    Patient:  Norma Nix  MRN: [id number]  YOB: 1955    Date: 10/10/22  Surgeon: Lanie Crouch MD  Anesthesia: Urojet Local  Indications: BPH  Position: Supine  EBL: 0 ml    Findings:   The patient was prepped and draped in the usual sterile fashion. The flexible cystoscope was advanced through the urethra and into the bladder. The bladder was thoroughly inspected and the following was noted:    Residual Urine: moderate. Urine clear, with no obvious infection  Urethra: No abnormalities of the urethra are noted. Urethral dilation was not performed. Prostate: trilobar hyperplasia++, obstructing , intravesical extension of prostate was present. There was no previous TURP defect. Bladder: no tumor noted . Moderate trabeculation noted. no bladder diverticulum. Ureters: Orifices with normal configuration and location. The cystoscope was removed. The patient tolerated the procedure well. Very large hypervascular prostate.  Reviewed PSA  Discussed risks of prostate surgery, scar tissue  Worsening voiding-- needs PVP (1.5)

## 2022-10-12 ENCOUNTER — TELEPHONE (OUTPATIENT)
Dept: UROLOGY | Age: 67
End: 2022-10-12

## 2022-10-12 NOTE — TELEPHONE ENCOUNTER
Patients wife called the office this morning asking if there has been an appointment made with [facility] yet? She states someone was going to call her Monday afternoon and she has not received a phone call back. Please give them a call back at [phone].

## 2022-10-12 NOTE — TELEPHONE ENCOUNTER
[address]         Patient:Werner Guzman           DWO:9[birth year]           Surgical/Procedure Planned: Greenlight PVP    Date & Location: 11/18/22 at [address] [place] [address]   Planned Length of OR: 1.5 hours    Sedation: general    This is a request for medical clearance.       Estimated Cardiac Risk for Non-Cardiac Surgery/Procedure     Low______ Moderate______ High______    Medication Instructions - Clarification needed by this date:     -Other medications:    Resume medications:     Lovenox indicated: _____Yes _____NO    Provider: Dr. Javier Wolff      Signature of Provider Giving Orders for Medication holds:    _____________________________________________

## 2022-10-17 NOTE — TELEPHONE ENCOUNTER
Greenlight PVP under general scheduled for 11/18/22 at 3:10 PM at [facility]. Medication list, insurance cards, and last OV notes available per Epic. Notified patient of procedure date and instructed to arrive 2 hours prior to procedure. Instructed to hold aspirin, vitamins, and supplements for one week prior to procedure. Patient instructed to be NPO after midnight and to hold all other medications until after procedure. Patient repeats instructions back and voices understanding.

## 2022-10-27 PROBLEM — Z09 POSTOP CHECK: Status: RESOLVED | Noted: 2022-09-27 | Resolved: 2022-10-27

## 2022-11-17 NOTE — H&P
Pre-op History and Physical      Patient:  Rene Reeves  MRN: [id number]  YOB: 1955    HISTORY OF PRESENT ILLNESS:     The patient is a 69 y.o. male who presents with BPH with LUTS. Here for Cystoscopy, Greenlight XPS. Patient's old records, notes and chart reviewed and summarized above. CT ABDOMEN PELVIS W IV CONTRAST Additional Contrast? None    Result Date: 9/19/2022  EXAMINATION: CT OF THE ABDOMEN AND PELVIS WITH CONTRAST 9/19/2022 5:56 pm TECHNIQUE: CT of the abdomen and pelvis was performed with the administration of intravenous contrast. Multiplanar reformatted images are provided for review. Automated exposure control, iterative reconstruction, and/or weight based adjustment of the mA/kV was utilized to reduce the radiation dose to as low as reasonably achievable. COMPARISON: None. HISTORY: ORDERING SYSTEM PROVIDED HISTORY: pain TECHNOLOGIST PROVIDED HISTORY: pain Decision Support Exception - unselect if not a suspected or confirmed emergency medical condition->Emergency Medical Condition (MA) Reason for Exam:  Difficulty urinating; lower abd pain; constipation; recent hernia repair FINDINGS: Lower Chest: Unremarkable. Organs: Hepatic cysts and punctate hypoattenuating lesions are too small to characterize but likely represent additional cysts. Gallbladder is unremarkable. No biliary ductal dilatation. Pancreas, adrenals, kidneys, spleen are unremarkable. Mild calcific atherosclerosis. GI/Bowel: Small hiatal hernia. Bowel is nondilated without wall thickening. Appendix is normal. Pelvis: Mild bladder wall thickening and trabeculation. Moderate prostatomegaly. Peritoneum/Retroperitoneum: Trace pneumoperitoneum. No free fluid, organized fluid collection, lymphadenopathy. Bones/Soft Tissues: Subcutaneous emphysema. No acute bony abnormality. 1. Trace pneumoperitoneum and subcutaneous emphysema, likely related to reported recent hernia repair. No other acute abnormality. 2. Moderate prostatomegaly with evidence of chronic outlet obstruction. RECOMMENDATIONS:         Past Medical History:    Past Medical History:   Diagnosis Date    Benign prostatic hyperplasia with urinary hesitancy 10/2022    COVID-19 vaccination declined 11/17/2022    Pt states not planning to take. Foot fracture 11/21/2016    left 5th Metatarsal fx- Dr. Suyapa Alcantara    Ganglion cyst of finger of right hand 07/2022    History of pneumonia 2009    Multiple fractures 06/06/2010    pelvis, sacrum, left ribs- due to 17 foot fall ( TV antenna tower) - no residual effects. Under care of team 11/17/2022    GENERAL SURGERY - DR. Gage Garcia - [facility] - last visit 7/2022    Under care of team 11/17/2022    UROLOGY - DR. Chitra Villa - last visit 10/2022    Wellness examination 11/17/2022    PCP - DR. Elle Soares - last visit 7/2022       Past Surgical History:    Past Surgical History:   Procedure Laterality Date    HERNIA REPAIR Left 9/16/2022    Laparoscopic Robotic Assisted Left Inguinal Hernia Repair w/ mesh-130 performed by Ankush Mcclain DO at [address] [place] [address] OR       Medications Prior to Admission:    Prior to Admission medications    Medication Sig Start Date End Date Taking? Authorizing Provider   tadalafil (CIALIS) 5 MG tablet Take 1 tablet by mouth in the morning.  7/19/22   Reji Nunes MD       Allergies:  Silodosin and Tamsulosin    Social History:    Social History     Socioeconomic History    Marital status:      Spouse name: Not on file    Number of children: Not on file    Years of education: Not on file    Highest education level: Not on file   Occupational History    Not on file   Tobacco Use    Smoking status: Never    Smokeless tobacco: Never   Vaping Use    Vaping Use: Never used   Substance and Sexual Activity    Alcohol use: Yes     Comment: \"A beer once or twice a month\"    Drug use: Not on file     Comment: No    Sexual activity: Yes     Partners: Female   Other Topics Concern Not on file   Social History Narrative    Not on file     Social Determinants of Health     Financial Resource Strain: Unknown    Difficulty of Paying Living Expenses: Patient refused   Food Insecurity: Unknown    Worried About Running Out of Food in the Last Year: Patient refused    Ran Out of Food in the Last Year: Patient refused   Transportation Needs: Not on file   Physical Activity: Inactive    Days of Exercise per Week: 0 days    Minutes of Exercise per Session: 0 min   Stress: Not on file   Social Connections: Not on file   Intimate Partner Violence: Not on file   Housing Stability: Not on file       Family History:    Family History   Problem Relation Age of Onset    Heart Disease Mother     Other Father          in accident    Diabetes Sister     Obesity Sister     No Known Problems Sister     No Known Problems Sister     Diabetes Brother     Other Brother         polio    Heart Disease Brother     Alcohol Abuse Brother        REVIEW OF SYSTEMS:  Constitutional: negative  Eyes: negative  Respiratory: negative  Cardiovascular: negative  Gastrointestinal: negative  Genitourinary: no acute issues  Musculoskeletal: negative  Skin: negative   Neurological: negative  Hematological/Lymphatic: negative  Psychological: negative    PHYSICAL EXAM:    Patient Vitals for the past 24 hrs:   Height Weight   22 0920 5' 7\" (1.702 m) 164 lb (74.4 kg)     Constitutional: Patient in NAD  Neuro: Alert and oriented to person, place, and time  Psych: Mood and affect normal  Skin: Clean, dry, intact   Lungs: Respiratory effort normal, CTA  Cardiovascular:  Normal peripheral pulses; no murmur. Normal rhythm  Abdomen: Soft, non-tender, non-distended, no hepatosplenomegaly or hernia, CVA tenderness none  Bladder: Non-tender and non-disdended   : Non-tender, skin intact, no lesions       LABS:   No results for input(s): WBC, HGB, HCT, MCV, PLT in the last 72 hours.   No results for input(s): NA, K, CL, CO2, PHOS, BUN, CREATININE, CA in the last 72 hours. Lab Results   Component Value Date    PSA 2.62 07/12/2022         Urinalysis: No results for input(s): COLORU, PHUR, LABCAST, WBCUA, RBCUA, MUCUS, TRICHOMONAS, YEAST, BACTERIA, CLARITYU, SPECGRAV, LEUKOCYTESUR, UROBILINOGEN, [place] in the last 72 hours. Invalid input(s): NITRATE, GLUCOSEUKETONESUAMORPHOUS     -----------------------------------------------------------------    ASSESSMENT AND PLAN:    Impression:    BPH with LUTS  Patient Active Problem List   Diagnosis    Left inguinal hernia    Benign prostatic hyperplasia with urinary hesitancy    Ganglion cyst of finger of right hand       Plan: Cystoscopy, Greenlight XPS in OR today. Consent obtained    The patient was counseled at length about the risks of seth Covid-19 during their perioperative period and any recovery window from their procedure. The patient was made aware that seth Covid-19  may worsen their prognosis for recovering from their procedure  and lend to a higher morbidity and/or mortality risk. All material risks, benefits, and reasonable alternatives including postponing the procedure were discussed. The patient does wish to proceed with the procedure at this time. Fan Gan MD  Urology Resident, PGY-3

## 2022-11-18 ENCOUNTER — HOSPITAL ENCOUNTER (OUTPATIENT)
Age: 67
Setting detail: OUTPATIENT SURGERY
Discharge: HOME | End: 2022-11-18
Attending: UROLOGY | Admitting: UROLOGY
Payer: MEDICARE

## 2022-11-18 ENCOUNTER — ANESTHESIA (OUTPATIENT)
Dept: OPERATING ROOM | Age: 67
End: 2022-11-18
Payer: MEDICARE

## 2022-11-18 ENCOUNTER — ANESTHESIA EVENT (OUTPATIENT)
Dept: OPERATING ROOM | Age: 67
End: 2022-11-18
Payer: MEDICARE

## 2022-11-18 VITALS
HEART RATE: 64 BPM | HEIGHT: 67 IN | OXYGEN SATURATION: 97 % | BODY MASS INDEX: 25.74 KG/M2 | DIASTOLIC BLOOD PRESSURE: 81 MMHG | TEMPERATURE: 97 F | WEIGHT: 164 LBS | SYSTOLIC BLOOD PRESSURE: 139 MMHG | RESPIRATION RATE: 14 BRPM

## 2022-11-18 PROCEDURE — 2580000003 HC RX 258: Performed by: ANESTHESIOLOGY

## 2022-11-18 PROCEDURE — 7100000000 HC PACU RECOVERY - FIRST 15 MIN: Performed by: UROLOGY

## 2022-11-18 PROCEDURE — 7100000010 HC PHASE II RECOVERY - FIRST 15 MIN: Performed by: UROLOGY

## 2022-11-18 PROCEDURE — 6360000002 HC RX W HCPCS: Performed by: PHYSICIAN ASSISTANT

## 2022-11-18 PROCEDURE — 2720000010 HC SURG SUPPLY STERILE: Performed by: UROLOGY

## 2022-11-18 PROCEDURE — 6360000002 HC RX W HCPCS: Performed by: SPECIALIST

## 2022-11-18 PROCEDURE — 2709999900 HC NON-CHARGEABLE SUPPLY: Performed by: UROLOGY

## 2022-11-18 PROCEDURE — 2580000003 HC RX 258: Performed by: UROLOGY

## 2022-11-18 PROCEDURE — 7100000001 HC PACU RECOVERY - ADDTL 15 MIN: Performed by: UROLOGY

## 2022-11-18 PROCEDURE — 3700000001 HC ADD 15 MINUTES (ANESTHESIA): Performed by: UROLOGY

## 2022-11-18 PROCEDURE — 3600000014 HC SURGERY LEVEL 4 ADDTL 15MIN: Performed by: UROLOGY

## 2022-11-18 PROCEDURE — 2500000003 HC RX 250 WO HCPCS: Performed by: SPECIALIST

## 2022-11-18 PROCEDURE — 3700000000 HC ANESTHESIA ATTENDED CARE: Performed by: UROLOGY

## 2022-11-18 PROCEDURE — 3600000004 HC SURGERY LEVEL 4 BASE: Performed by: UROLOGY

## 2022-11-18 RX ORDER — FENTANYL CITRATE 50 UG/ML
INJECTION, SOLUTION INTRAMUSCULAR; INTRAVENOUS PRN
Status: DISCONTINUED | OUTPATIENT
Start: 2022-11-18 | End: 2022-11-18 | Stop reason: SDUPTHER

## 2022-11-18 RX ORDER — ONDANSETRON 2 MG/ML
INJECTION INTRAMUSCULAR; INTRAVENOUS PRN
Status: DISCONTINUED | OUTPATIENT
Start: 2022-11-18 | End: 2022-11-18 | Stop reason: SDUPTHER

## 2022-11-18 RX ORDER — PROPOFOL 10 MG/ML
INJECTION, EMULSION INTRAVENOUS PRN
Status: DISCONTINUED | OUTPATIENT
Start: 2022-11-18 | End: 2022-11-18 | Stop reason: SDUPTHER

## 2022-11-18 RX ORDER — HYOSCYAMINE SULFATE 0.12 MG/1
0.12 TABLET SUBLINGUAL EVERY 6 HOURS PRN
Qty: 30 EACH | Refills: 0 | Status: SHIPPED | OUTPATIENT
Start: 2022-11-18

## 2022-11-18 RX ORDER — SODIUM CHLORIDE 9 MG/ML
INJECTION, SOLUTION INTRAVENOUS PRN
Status: DISCONTINUED | OUTPATIENT
Start: 2022-11-18 | End: 2022-11-18 | Stop reason: HOSPADM

## 2022-11-18 RX ORDER — SODIUM CHLORIDE, SODIUM LACTATE, POTASSIUM CHLORIDE, CALCIUM CHLORIDE 600; 310; 30; 20 MG/100ML; MG/100ML; MG/100ML; MG/100ML
INJECTION, SOLUTION INTRAVENOUS CONTINUOUS
Status: DISCONTINUED | OUTPATIENT
Start: 2022-11-18 | End: 2022-11-18 | Stop reason: HOSPADM

## 2022-11-18 RX ORDER — EPHEDRINE SULFATE/0.9% NACL/PF 50 MG/5 ML
SYRINGE (ML) INTRAVENOUS PRN
Status: DISCONTINUED | OUTPATIENT
Start: 2022-11-18 | End: 2022-11-18 | Stop reason: SDUPTHER

## 2022-11-18 RX ORDER — FENTANYL CITRATE 50 UG/ML
50 INJECTION, SOLUTION INTRAMUSCULAR; INTRAVENOUS EVERY 5 MIN PRN
Status: DISCONTINUED | OUTPATIENT
Start: 2022-11-18 | End: 2022-11-18 | Stop reason: HOSPADM

## 2022-11-18 RX ORDER — CEFADROXIL 500 MG/1
500 CAPSULE ORAL 2 TIMES DAILY
Qty: 6 CAPSULE | Refills: 0 | Status: SHIPPED | OUTPATIENT
Start: 2022-11-18 | End: 2022-11-21

## 2022-11-18 RX ORDER — SODIUM CHLORIDE 0.9 % (FLUSH) 0.9 %
5-40 SYRINGE (ML) INJECTION EVERY 12 HOURS SCHEDULED
Status: DISCONTINUED | OUTPATIENT
Start: 2022-11-18 | End: 2022-11-18 | Stop reason: HOSPADM

## 2022-11-18 RX ORDER — SODIUM CHLORIDE 0.9 % (FLUSH) 0.9 %
5-40 SYRINGE (ML) INJECTION PRN
Status: DISCONTINUED | OUTPATIENT
Start: 2022-11-18 | End: 2022-11-18 | Stop reason: HOSPADM

## 2022-11-18 RX ORDER — MAGNESIUM HYDROXIDE 1200 MG/15ML
LIQUID ORAL CONTINUOUS PRN
Status: DISCONTINUED | OUTPATIENT
Start: 2022-11-18 | End: 2022-11-18 | Stop reason: HOSPADM

## 2022-11-18 RX ORDER — FENTANYL CITRATE 50 UG/ML
25 INJECTION, SOLUTION INTRAMUSCULAR; INTRAVENOUS EVERY 5 MIN PRN
Status: DISCONTINUED | OUTPATIENT
Start: 2022-11-18 | End: 2022-11-18 | Stop reason: HOSPADM

## 2022-11-18 RX ORDER — LIDOCAINE HYDROCHLORIDE 10 MG/ML
INJECTION, SOLUTION EPIDURAL; INFILTRATION; INTRACAUDAL; PERINEURAL PRN
Status: DISCONTINUED | OUTPATIENT
Start: 2022-11-18 | End: 2022-11-18 | Stop reason: SDUPTHER

## 2022-11-18 RX ADMIN — PROPOFOL 160 MG: 10 INJECTION, EMULSION INTRAVENOUS at 12:54

## 2022-11-18 RX ADMIN — FENTANYL CITRATE 25 MCG: 50 INJECTION, SOLUTION INTRAMUSCULAR; INTRAVENOUS at 13:55

## 2022-11-18 RX ADMIN — FENTANYL CITRATE 50 MCG: 50 INJECTION, SOLUTION INTRAMUSCULAR; INTRAVENOUS at 12:54

## 2022-11-18 RX ADMIN — SODIUM CHLORIDE, POTASSIUM CHLORIDE, SODIUM LACTATE AND CALCIUM CHLORIDE: 600; 310; 30; 20 INJECTION, SOLUTION INTRAVENOUS at 14:33

## 2022-11-18 RX ADMIN — FENTANYL CITRATE 25 MCG: 50 INJECTION, SOLUTION INTRAMUSCULAR; INTRAVENOUS at 13:37

## 2022-11-18 RX ADMIN — ONDANSETRON 4 MG: 2 INJECTION INTRAMUSCULAR; INTRAVENOUS at 14:38

## 2022-11-18 RX ADMIN — LIDOCAINE HYDROCHLORIDE 50 MG: 10 INJECTION, SOLUTION EPIDURAL; INFILTRATION; INTRACAUDAL; PERINEURAL at 12:54

## 2022-11-18 RX ADMIN — SODIUM CHLORIDE, POTASSIUM CHLORIDE, SODIUM LACTATE AND CALCIUM CHLORIDE: 600; 310; 30; 20 INJECTION, SOLUTION INTRAVENOUS at 12:12

## 2022-11-18 RX ADMIN — FENTANYL CITRATE 25 MCG: 50 INJECTION, SOLUTION INTRAMUSCULAR; INTRAVENOUS at 13:22

## 2022-11-18 RX ADMIN — Medication 10 MG: at 13:11

## 2022-11-18 RX ADMIN — Medication 2000 MG: at 13:02

## 2022-11-18 RX ADMIN — FENTANYL CITRATE 25 MCG: 50 INJECTION, SOLUTION INTRAMUSCULAR; INTRAVENOUS at 14:31

## 2022-11-18 ASSESSMENT — PAIN SCALES - GENERAL: PAINLEVEL_OUTOF10: 0

## 2022-11-18 NOTE — ANESTHESIA PRE PROCEDURE
Department of Anesthesiology  Preprocedure Note       Name:  Monica Peña   Age:  69 y.o.  :  1955                                          MRN:  [id number]         Date:  2022      Surgeon: Yohana Tan):  Yamilet Smith MD    Procedure: Procedure(s):  CYSTOSCOPY, GREENLIGHT (FORTEC# NICK CONF# [id number])    Medications prior to admission:   Prior to Admission medications    Medication Sig Start Date End Date Taking? Authorizing Provider   tadalafil (CIALIS) 5 MG tablet Take 1 tablet by mouth in the morning. 22   Karen London MD       Current medications:    No current outpatient medications on file. No current facility-administered medications for this visit. Allergies: Allergies   Allergen Reactions    Other      Perfume any scents coffee causes arm numbness    Silodosin Other (See Comments)     confusion    Tamsulosin      Confusion       Problem List:    Patient Active Problem List   Diagnosis Code    Left inguinal hernia K40.90    Benign prostatic hyperplasia with urinary hesitancy N40.1, R39.11    Ganglion cyst of finger of right hand M67.441       Past Medical History:        Diagnosis Date    Benign prostatic hyperplasia with urinary hesitancy 10/2022    COVID-19 vaccination declined 2022    Pt states not planning to take.  Foot fracture 2016    left 5th Metatarsal fx- Dr. Anu Ca    Ganglion cyst of finger of right hand 2022    History of pneumonia 2009    Multiple fractures 2010    pelvis, sacrum, left ribs- due to 17 foot fall ( TV antenna tower) - no residual effects.  Prolonged emergence from general anesthesia     Under care of team 2022    GENERAL SURGERY - DR. Daylin Plata - [facility] - last visit 2022    Under care of team 2022    UROLOGY - DR. Abdirashid Rivera - last visit 10/2022    Wellness examination 2022    PCP - DR. Elle Soares - last visit 2022       Past Surgical History:        Procedure Laterality Date    HERNIA REPAIR Left 9/16/2022    Laparoscopic Robotic Assisted Left Inguinal Hernia Repair w/ mesh-130 performed by Adam Rossi DO at [facility] OR       Social History:    Social History     Tobacco Use    Smoking status: Never    Smokeless tobacco: Never   Substance Use Topics    Alcohol use: Yes     Comment: \"A beer once or twice a month\"                                Counseling given: Not Answered      Vital Signs (Current): There were no vitals filed for this visit. BP Readings from Last 3 Encounters:   10/10/22 112/62   09/27/22 106/62   09/26/22 114/72       NPO Status:                                                                                 BMI:   Wt Readings from Last 3 Encounters:   11/17/22 164 lb (74.4 kg)   10/10/22 171 lb (77.6 kg)   09/27/22 171 lb (77.6 kg)     There is no height or weight on file to calculate BMI.    CBC:   Lab Results   Component Value Date/Time    WBC 6.3 09/19/2022 05:15 PM    RBC 4.09 09/19/2022 05:15 PM    HGB 13.4 09/19/2022 05:15 PM    HCT 38.4 09/19/2022 05:15 PM    MCV 93.9 09/19/2022 05:15 PM    RDW 11.7 09/19/2022 05:15 PM     09/19/2022 05:15 PM       CMP:   Lab Results   Component Value Date/Time     09/19/2022 05:15 PM    K 4.2 09/19/2022 05:15 PM     09/19/2022 05:15 PM    CO2 26 09/19/2022 05:15 PM    BUN 18 09/19/2022 05:15 PM    CREATININE 0.78 09/19/2022 05:15 PM    GFRAA >60 09/19/2022 05:15 PM    LABGLOM >60 09/19/2022 05:15 PM    GLUCOSE 95 09/19/2022 05:15 PM    PROT 6.6 09/19/2022 05:15 PM    CALCIUM 9.0 09/19/2022 05:15 PM    BILITOT 0.5 09/19/2022 05:15 PM    ALKPHOS 59 09/19/2022 05:15 PM    AST 16 09/19/2022 05:15 PM    ALT 12 09/19/2022 05:15 PM       POC Tests: No results for input(s): POCGLU, POCNA, POCK, POCCL, POCBUN, POCHEMO, POCHCT in the last 72 hours.     Coags: No results found for: PROTIME, INR, APTT    HCG (If Applicable): No results found for:

## 2022-11-18 NOTE — OP NOTE
Operative Note      Patient: Edward An  YOB: 1955  MRN: [id number]    Date of Procedure: 11/18/2022    Pre-Op Diagnosis: BPH    Post-Op Diagnosis: Same       Procedure(s):  Clayton Davenport (FORTEC# AMBER CONF# [id number])    Surgeon(s):  Lizzie Tavares MD    Assistant:   Resident: Edgar Currie MD    Anesthesia: General    Estimated Blood Loss (mL): Minimal    Complications: None    Specimens:   * No specimens in log *    Implants:  * No implants in log *      Drains:   Urinary Catheter 11/18/22 3 Way (Active)   Catheter Indications Need for fluid volume management of the critically ill patient in a critical care setting 11/18/22 1600   Site Assessment No urethral drainage 11/18/22 1600   Urine Color Cherry 11/18/22 1600   Urine Appearance Clear 11/18/22 1600   Securement Method Leg strap 11/18/22 1600   Catheter Best Practices  Bag not on floor; Bag below bladder;Catheter secured to thigh 11/18/22 1600   Status Draining 11/18/22 1600   Output (mL) 425 mL 11/18/22 1518       Findings: Completely obstructing, kissing bilateral prostatic lobes    Detailed Description of Procedure: The risks and benefits of the procedure were explained to the patient in the preoperative area. After informed consent was obtained, the patient was taken back to the operating room. The patient was transferred to the operating table and placed in a supine position. General anesthesia was induced and the patient was placed in the dorsal lithotomy position. He was prepped and draped in a sterile fashion and a time-out was performed to confirm patient identity and procedure. Prior to induction of anesthesia the patient was administered preoperative antibiotics and EPC cuffs were on and functioning. Our continuous flow sheath with obturator and lens was inserted through the patient's urethra and into the bladder. Upon entering the bladder we located both ureteral orifices.  On evaluation of the prostate the patient was noted to have bilateral prostatic lobes that are partially obstructing. The GreenLight fiber was then inserted after we removed our obturator and placed our working bridge through out continous flow sheath. GreenLight photovaporization was initiated by creating two troughs at the [employer] and 7'o clock positions. Then the median lobe was taken down. Beginning at the bladder neck and working distally GreenLight photovaporization was performed. The process was performed on both the right and left lobes of the prostate. Finally we turned our attention to the apical tissue, which was very cindi and obstructing. They were falling upon the verumontanum. While using the verumontanum for a marker for the external urinary sphincter, apical prostate tissue was resected using the GreenLight laser. However, the due to the large apical prostate tissue falling over the location of the verumontanum and thus the external sphincter, a complete resection of apical prostate tissue was not done so as to prevent injury to the external sphincter. Extensive photovaporization of the prostate was performed. The prostatic urethra appeared to be open and no longer obstructed. No significant bleeding was noted at the conclusion of the procedure. Both ureteral orifices were noted to be uninvolved in the resection. There was no scatter of laser fiber into the bladder. We did not go distal to the verumontanum. We worked at 120 to 180 mcneill of power. The scope was removed and a 22-Citizen of [place] 3-way Barclay catheter was inserted and irrigated to confirm position. Continuous bladder irrigation with normal saline was then initiated and 3 L of normal saline were allowed to infuse before the catheter was clamped. The patient was awoken and transferred to PACU. All instruments and equipment were accounted for at the end of the procedure. Dr. Юлия Zaragoza was present and scrubbed for the entire procedure.     Disposition: Patient is to be weaned off CBI in the PACU. He is to be discharged with bassett catheter and will have void trial in 3 days. Discharged with oral pain medications and oral antibiotics.      Electronically signed by Elizabeth Macdonald MD on 11/18/2022 at 5:30 PM

## 2022-11-19 NOTE — ANESTHESIA POSTPROCEDURE EVALUATION
Department of Anesthesiology  Postprocedure Note    Patient: Norma Anaya  MRN: [id number]  YOB: 1955  Date of evaluation: 11/19/2022      Procedure Summary     Date: 11/18/22 Room / Location: [address]    Anesthesia Start: 0195 Anesthesia Stop: 8982    Procedure: Rochelle Watson (FORTEC# AMBER CONF# [id number]) (Urethra) Diagnosis:       Benign prostatic hyperplasia, unspecified whether lower urinary tract symptoms present      (BPH)    Surgeons: Karishma Diaz MD Responsible Provider: Kelly Tan MD    Anesthesia Type: general ASA Status: 1          Anesthesia Type: No value filed.     Duane Phase I: Duane Score: 10    Duane Phase II:        Anesthesia Post Evaluation    Patient location during evaluation: PACU  Patient participation: complete - patient participated  Level of consciousness: awake and alert  Pain score: 0  Nausea & Vomiting: no vomiting  Cardiovascular status: hemodynamically stable  Respiratory status: room air

## 2022-11-21 ENCOUNTER — NURSE ONLY (OUTPATIENT)
Dept: UROLOGY | Age: 67
End: 2022-11-21
Payer: MEDICARE

## 2022-11-21 DIAGNOSIS — N40.1 BENIGN LOCALIZED PROSTATIC HYPERPLASIA WITH LOWER URINARY TRACT SYMPTOMS (LUTS): Primary | ICD-10-CM

## 2022-11-21 PROCEDURE — 99212 OFFICE O/P EST SF 10 MIN: CPT

## 2022-11-21 NOTE — PROGRESS NOTES
Patient present for bassett removal post cysto greenlight, orange color urine noted in drainage bag, 30 ml balloon deflated, and 22 fr was removed without difficulty. Instructed patient to drink fluids, patient may experience urinary leakage, blood in the urine, and it may burn when urinating for a few weeks. If patient is unable to urinate in 8 hours, is to call the office to have bassett placed or needs to go to the emergency room. Patient verbalized understanding.

## 2022-12-19 ENCOUNTER — CLINICAL DOCUMENTATION (OUTPATIENT)
Dept: SPIRITUAL SERVICES | Age: 67
End: 2022-12-19

## 2022-12-19 ENCOUNTER — OFFICE VISIT (OUTPATIENT)
Dept: UROLOGY | Age: 67
End: 2022-12-19
Payer: MEDICARE

## 2022-12-19 VITALS
HEART RATE: 62 BPM | OXYGEN SATURATION: 96 % | SYSTOLIC BLOOD PRESSURE: 114 MMHG | BODY MASS INDEX: 25.74 KG/M2 | HEIGHT: 67 IN | RESPIRATION RATE: 16 BRPM | WEIGHT: 164 LBS | DIASTOLIC BLOOD PRESSURE: 68 MMHG

## 2022-12-19 DIAGNOSIS — N40.1 BENIGN LOCALIZED PROSTATIC HYPERPLASIA WITH LOWER URINARY TRACT SYMPTOMS (LUTS): Primary | ICD-10-CM

## 2022-12-19 PROCEDURE — 99024 POSTOP FOLLOW-UP VISIT: CPT | Performed by: UROLOGY

## 2022-12-19 PROCEDURE — 99212 OFFICE O/P EST SF 10 MIN: CPT | Performed by: UROLOGY

## 2022-12-19 NOTE — PROGRESS NOTES
S/p PVP  Very large prostate with adenoma protruding passed verumontanum  Pvr 54 cc.   Frequency /urgency  Did recently have hernia surgery      Pvr and follow up in three [facility]

## 2022-12-19 NOTE — ACP (ADVANCE CARE PLANNING)
Advance Care Planning   Ambulatory ACP Specialist Patient Outreach    Date:  12/19/2022  ACP Specialist:  MESERET Porter    Outreach call to patient in follow-up to ACP Specialist referral from: Debby Paris MD    [x] PCP  [] Provider   [] Ambulatory Care Management [] Other for Reason:    [x] Advance Directive Assistance  [] Code Status Discussion  [] Complete Portable DNR Order  [] Discuss Goals of Care  [] Complete POST/MOST  [x] Early ACP Decision-Making  [] Other    Date Referral Received: 7/19/22    Today's Outreach:  [] First   [] Second  [x] Third                               Third outreach made by []  phone  [] email []   [employer]     Intervention:  [x] Spoke with Patient  [] Left VM requesting return call      Outcome: ACP Specialist spoke with patient and his wife Chandni Medrano. The couple would like to meet with a  together in January to discuss ACP. Staff scheduled an ACP Conversation for 1/19/23 at 1:00 PM.  Staff has also sent a message to  and ask  to follow up and confirm date and location of meeting. Next Step:   [x] ACP scheduled conversation  [] Outreach again in one week               [] Email / Mail ACP Info Sheets  [] Email / Mail Advance Directive            [] Close Referral. Routing closure to referring provider/staff and to ACP Specialist . [] Closure Letter mailed to Patient with Invitation to Contact ACP Specialist if/when ready.     Thank you for this referral.

## 2022-12-22 ENCOUNTER — CLINICAL DOCUMENTATION (OUTPATIENT)
Dept: SPIRITUAL SERVICES | Age: 67
End: 2022-12-22

## 2022-12-22 NOTE — ACP (ADVANCE CARE PLANNING)
Advance Care Planning   Ambulatory ACP Specialist Patient Outreach    Date:  12/22/2022  ACP Specialist:  Jen Chirinos    Outreach call to patient in follow-up to ACP Specialist referral from: Rakel Abbott MD    [] PCP  [] Provider   [] Ambulatory Care Management [] Other for Reason:    [] Advance Directive Assistance  [] Code Status Discussion  [] Complete Portable DNR Order  [] Discuss Goals of Care  [] Complete POST/MOST  [] Early ACP Decision-Making  [] Other    Date Referral Received:7/11/22      Today's Outreach:  [] First   [] Second  [x] Third                               Third outreach made by [x]  phone  [] email []   [email or web address]     Intervention:  [] Spoke with Patient  [] Left VM requesting return call      Outcome: Following e-mail from Ritu Jacobs this writer phoned patient for follow up. Patient's wife acknowledged receiving forms via mail but they had not filled them out and cancelled the 10/20/22 appointment. She affirmed that they want to complete documents but that he has been reluctant to complete the conversation. This writer offered help in filling the documents out and answering any questions if they would consent to a no cost appointment. Patient declined to make an appointment at this time and committed to phoning for an appointment \"after the first of the year. \"  This referral was closed from the writer's Workqueue and needs to be issued again. Next Step:   [] ACP scheduled conversation  [x] Outreach again second week of January 2023 if an appointment has not been made. [] Email / Mail ACP Info Sheets  [] Email / Mail Advance Directive            [] Close Referral. Routing closure to referring provider/staff and to ACP Specialist . [] Closure Letter mailed to Patient with Invitation to Contact ACP Specialist if/when ready.     Thank you for this referral.

## 2022-12-22 NOTE — FLOWSHEET NOTE
12/22/22 1100   Encounter Summary   Encounter Overview/Reason  Advance Care Planning   Service Provided For: Family   Referral/Consult From: Physician   Last Encounter  12/22/22   Complexity of Encounter Moderate   Begin Time 1054   End Time  1101   Total Time Calculated 7 min   Advance Care Planning   Type ACP conversation

## 2023-02-02 ENCOUNTER — CLINICAL DOCUMENTATION (OUTPATIENT)
Dept: SPIRITUAL SERVICES | Age: 68
End: 2023-02-02

## 2023-02-02 NOTE — ACP (ADVANCE CARE PLANNING)
Advance Care Planning   Ambulatory ACP Specialist Patient Outreach    Date:  2/2/2023  ACP Specialist:  RAVINDRA Gunter    Outreach call to patient in follow-up to ACP Specialist referral from: Alondra Valenzuela MD    [x] PCP  [] Provider   [] Ambulatory Care Management [] Other for Reason:    [x] Advance Directive Assistance  [] Code Status Discussion  [] Complete Portable DNR Order  [] Discuss Goals of Care  [] Complete POST/MOST  [] Early ACP Decision-Making  [x] Other: living will    Date Referral Received:07/19/2022    Today's Outreach:  [] First   [] Second  [] Third [] Fourth   [x] [facility] outreach made by [x]  phone  [] email []   [employer]     Intervention:  [x] Spoke with Patient  [] Left VM requesting return call      Outcome: Follow up call made to pt at request of ACP . Called pt and he asked that SW call wife to discuss ACP follow up. Placed call to wife, Serena Jackson, and she accepted SW contact. Per wife, they are so busy right now with their work that they do not have time to meet to complete ACP visit. Offered virtual visit but wife said they would prefer to meet in person. Provided SW contact and wife said she will reach out when they are able to meet. Explained that wife can call this SW and will assist with in-person visit accordingly. Referral can be closed. Next Step:   [] ACP scheduled conversation  [] Outreach again in one week               [] Email / Mail ACP Info Sheets  [] Email / Mail Advance Directive            [x] Close Referral. Routing closure to referring provider/staff and to ACP Specialist . [] Closure Letter mailed to Patient with Invitation to Contact ACP Specialist if/when ready.     Thank you for this referral.

## 2023-03-20 ENCOUNTER — OFFICE VISIT (OUTPATIENT)
Dept: UROLOGY | Age: 68
End: 2023-03-20
Payer: MEDICARE

## 2023-03-20 VITALS
OXYGEN SATURATION: 99 % | HEART RATE: 62 BPM | HEIGHT: 67 IN | DIASTOLIC BLOOD PRESSURE: 76 MMHG | SYSTOLIC BLOOD PRESSURE: 138 MMHG | BODY MASS INDEX: 26.06 KG/M2 | WEIGHT: 166 LBS | RESPIRATION RATE: 14 BRPM

## 2023-03-20 DIAGNOSIS — R39.11 BENIGN PROSTATIC HYPERPLASIA WITH URINARY HESITANCY: ICD-10-CM

## 2023-03-20 DIAGNOSIS — N52.9 ERECTILE DYSFUNCTION, UNSPECIFIED ERECTILE DYSFUNCTION TYPE: ICD-10-CM

## 2023-03-20 DIAGNOSIS — N40.1 BENIGN PROSTATIC HYPERPLASIA WITH URINARY HESITANCY: ICD-10-CM

## 2023-03-20 DIAGNOSIS — N40.1 BENIGN LOCALIZED PROSTATIC HYPERPLASIA WITH LOWER URINARY TRACT SYMPTOMS (LUTS): Primary | ICD-10-CM

## 2023-03-20 LAB — POST VOID RESIDUAL (PVR): 19 ML

## 2023-03-20 PROCEDURE — 99213 OFFICE O/P EST LOW 20 MIN: CPT | Performed by: UROLOGY

## 2023-03-20 PROCEDURE — 51798 US URINE CAPACITY MEASURE: CPT | Performed by: UROLOGY

## 2023-03-20 PROCEDURE — 99212 OFFICE O/P EST SF 10 MIN: CPT | Performed by: UROLOGY

## 2023-03-20 PROCEDURE — PBSHW PR MEAS POST-VOIDING RESIDUAL URINE&/BLADDER CAP: Performed by: UROLOGY

## 2023-03-20 PROCEDURE — 1123F ACP DISCUSS/DSCN MKR DOCD: CPT | Performed by: UROLOGY

## 2023-03-20 NOTE — PROGRESS NOTES
Post void residual by bladder scanner 19cc.
HPI  Musculoskeletal: negative  Skin: negative   Neurological: negative  Hematological/Lymphatic: negative  Psychological: negative        Physical Exam:    This a 77 y.o. male  Vitals:    03/20/23 0918   BP: 138/76   Pulse: 62   Resp: 14   SpO2: 99%     Body mass index is 26 kg/m². Constitutional: Patient in no acute distress;       Assessment and Plan        1. Benign localized prostatic hyperplasia with lower urinary tract symptoms (LUTS)    2. Erectile dysfunction, unspecified erectile dysfunction type               Plan:      BPH with retention- hx of PVP. VERY large prostate with prostate protruding past verumontanum. He is voiding great with very low pvr. ED- he is no longer using cialis. We can discontinue this. F/u 1 year with pvr with np      Prescriptions Ordered:  No orders of the defined types were placed in this encounter.      Orders Placed:  Orders Placed This Encounter   Procedures    NE DAYAN POST-VOIDING RESIDUAL URINE&/BLADDER CAP              CHINA Tello MD

## 2023-05-29 ENCOUNTER — HOSPITAL ENCOUNTER (EMERGENCY)
Age: 68
Discharge: HOME | End: 2023-05-29
Attending: EMERGENCY MEDICINE
Payer: MEDICARE

## 2023-05-29 ENCOUNTER — APPOINTMENT (OUTPATIENT)
Dept: CT IMAGING | Age: 68
End: 2023-05-29
Payer: MEDICARE

## 2023-05-29 VITALS
DIASTOLIC BLOOD PRESSURE: 82 MMHG | HEIGHT: 67 IN | OXYGEN SATURATION: 94 % | HEART RATE: 71 BPM | TEMPERATURE: 98.4 F | WEIGHT: 163 LBS | RESPIRATION RATE: 18 BRPM | SYSTOLIC BLOOD PRESSURE: 130 MMHG | BODY MASS INDEX: 25.58 KG/M2

## 2023-05-29 DIAGNOSIS — R41.3 TRANSIENT AMNESIA: Primary | ICD-10-CM

## 2023-05-29 LAB
ANION GAP SERPL CALCULATED.3IONS-SCNC: 10 MMOL/L (ref 9–17)
BASOPHILS # BLD: <0.03 K/UL (ref 0–0.2)
BASOPHILS NFR BLD: 0 % (ref 0–2)
BUN SERPL-MCNC: 15 MG/DL (ref 8–23)
BUN/CREAT SERPL: 15 (ref 9–20)
CALCIUM SERPL-MCNC: 9.3 MG/DL (ref 8.6–10.4)
CHLORIDE SERPL-SCNC: 105 MMOL/L (ref 98–107)
CO2 SERPL-SCNC: 26 MMOL/L (ref 20–31)
CREAT SERPL-MCNC: 1.01 MG/DL (ref 0.7–1.2)
EOSINOPHIL # BLD: 0.03 K/UL (ref 0–0.44)
EOSINOPHILS RELATIVE PERCENT: 1 % (ref 1–4)
ERYTHROCYTE [DISTWIDTH] IN BLOOD BY AUTOMATED COUNT: 11.8 % (ref 11.8–14.4)
GFR SERPL CREATININE-BSD FRML MDRD: >60 ML/MIN/1.73M2
GLUCOSE BLD-MCNC: 128 MG/DL (ref 75–110)
GLUCOSE SERPL-MCNC: 114 MG/DL (ref 70–99)
HCT VFR BLD AUTO: 40.2 % (ref 40.7–50.3)
HGB BLD-MCNC: 14.1 G/DL (ref 13–17)
IMM GRANULOCYTES # BLD AUTO: <0.03 K/UL (ref 0–0.3)
IMM GRANULOCYTES NFR BLD: 0 %
INR PPP: 1
LYMPHOCYTES # BLD: 15 % (ref 24–43)
LYMPHOCYTES NFR BLD: 0.81 K/UL (ref 1.1–3.7)
MCH RBC QN AUTO: 32.4 PG (ref 25.2–33.5)
MCHC RBC AUTO-ENTMCNC: 35.1 G/DL (ref 25.2–33.5)
MCV RBC AUTO: 92.4 FL (ref 82.6–102.9)
MONOCYTES NFR BLD: 0.47 K/UL (ref 0.1–1.2)
MONOCYTES NFR BLD: 9 % (ref 3–12)
NEUTROPHILS NFR BLD: 75 % (ref 36–65)
NEUTS SEG NFR BLD: 3.92 K/UL (ref 1.5–8.1)
NRBC AUTOMATED: 0 PER 100 WBC
PLATELET # BLD AUTO: 175 K/UL (ref 138–453)
PMV BLD AUTO: 9.8 FL (ref 8.1–13.5)
POTASSIUM SERPL-SCNC: 3.8 MMOL/L (ref 3.7–5.3)
PROTHROMBIN TIME: 13 SEC (ref 11.5–14.2)
RBC # BLD AUTO: 4.35 M/UL (ref 4.21–5.77)
SODIUM SERPL-SCNC: 141 MMOL/L (ref 135–144)
TROPONIN I SERPL HS-MCNC: 7 NG/L (ref 0–22)
WBC OTHER # BLD: 5.3 K/UL (ref 3.5–11.3)

## 2023-05-29 PROCEDURE — 99284 EMERGENCY DEPT VISIT MOD MDM: CPT | Performed by: PSYCHIATRY & NEUROLOGY

## 2023-05-29 PROCEDURE — 84484 ASSAY OF TROPONIN QUANT: CPT

## 2023-05-29 PROCEDURE — 85610 PROTHROMBIN TIME: CPT

## 2023-05-29 PROCEDURE — 85025 COMPLETE CBC W/AUTO DIFF WBC: CPT

## 2023-05-29 PROCEDURE — 80048 BASIC METABOLIC PNL TOTAL CA: CPT

## 2023-05-29 PROCEDURE — 70450 CT HEAD/BRAIN W/O DYE: CPT

## 2023-05-29 PROCEDURE — 82947 ASSAY GLUCOSE BLOOD QUANT: CPT

## 2023-05-29 PROCEDURE — 93005 ELECTROCARDIOGRAM TRACING: CPT | Performed by: EMERGENCY MEDICINE

## 2023-05-29 PROCEDURE — 99284 EMERGENCY DEPT VISIT MOD MDM: CPT

## 2023-05-29 RX ORDER — SODIUM CHLORIDE 0.9 % (FLUSH) 0.9 %
3 SYRINGE (ML) INJECTION EVERY 8 HOURS
Status: DISCONTINUED | OUTPATIENT
Start: 2023-05-29 | End: 2023-05-29 | Stop reason: HOSPADM

## 2023-05-29 ASSESSMENT — LIFESTYLE VARIABLES
HOW OFTEN DO YOU HAVE A DRINK CONTAINING ALCOHOL: NEVER
HOW MANY STANDARD DRINKS CONTAINING ALCOHOL DO YOU HAVE ON A TYPICAL DAY: PATIENT DOES NOT DRINK

## 2023-05-29 ASSESSMENT — PAIN - FUNCTIONAL ASSESSMENT: PAIN_FUNCTIONAL_ASSESSMENT: NONE - DENIES PAIN

## 2023-05-29 NOTE — VIRTUAL HEALTH
[facility] Stroke and Vascular Neurology Consult for  [place] Stroke Alert through [address] @ 17:14  5/29/2023 6:32 PM    Pt Name: Madhu Noel  MRN: [id number]  [place]: 1955  Date of evaluation: 5/29/2023  Primary Care Physician: Nalini Haas MD  Reason for Evaluation: Stroke Evaluation with Discussion with Ed or primary team with Telemedicine and stroke evaluation with Review of imaging and labs    Madhu Noel is a 77 y.o. male who presents with symptoms after golfing during the day. Last well 9:00am with symptoms of not remembering using a golf cart that morning. And trouble for a period of time of remembering new events. Now improving and starting to recollect events during the day    No seizure noted. Possible Transient global amnesia. No headache    LKW: 9:00  NIH:  0    Allergies  is allergic to other, silodosin, and tamsulosin. Medications  Prior to Admission medications    Not on File    Scheduled Meds:   sodium chloride flush  3 mL IntraVENous Q8H     Continuous Infusions:  PRN Meds:.  Past Medical History   has a past medical history of Benign prostatic hyperplasia with urinary hesitancy, COVID-19 vaccination declined, Foot fracture, Ganglion cyst of finger of right hand, History of pneumonia, Multiple fractures, Prolonged emergence from general anesthesia, Under care of team, Under care of team, and Wellness examination.   Social History  Social History     Socioeconomic History    Marital status:      Spouse name: Not on file    Number of children: Not on file    Years of education: Not on file    Highest education level: Not on file   Occupational History    Not on file   Tobacco Use    Smoking status: Never    Smokeless tobacco: Never   Vaping Use    Vaping Use: Never used   Substance and Sexual Activity    Alcohol use: Yes     Comment: \"A beer once or twice a month\"    Drug use: Not on file     Comment: No    Sexual activity: Yes

## 2023-05-30 LAB
EKG ATRIAL RATE: 71 BPM
EKG P AXIS: 57 DEGREES
EKG P-R INTERVAL: 200 MS
EKG Q-T INTERVAL: 360 MS
EKG QRS DURATION: 90 MS
EKG QTC CALCULATION (BAZETT): 391 MS
EKG R AXIS: -4 DEGREES
EKG T AXIS: 21 DEGREES
EKG VENTRICULAR RATE: 71 BPM

## 2023-07-28 ENCOUNTER — TELEPHONE (OUTPATIENT)
Dept: FAMILY MEDICINE CLINIC | Age: 68
End: 2023-07-28

## 2023-07-28 NOTE — TELEPHONE ENCOUNTER
Attempted to reach patient regarding missed appointment on 7/28/2023. Unable to contact at this time. Left message to reschedule.

## 2023-08-24 ENCOUNTER — OFFICE VISIT (OUTPATIENT)
Dept: FAMILY MEDICINE CLINIC | Age: 68
End: 2023-08-24
Payer: MEDICARE

## 2023-08-24 VITALS
HEART RATE: 58 BPM | SYSTOLIC BLOOD PRESSURE: 128 MMHG | BODY MASS INDEX: 26.26 KG/M2 | WEIGHT: 167.3 LBS | OXYGEN SATURATION: 98 % | DIASTOLIC BLOOD PRESSURE: 72 MMHG | TEMPERATURE: 97.9 F | HEIGHT: 67 IN

## 2023-08-24 DIAGNOSIS — Z00.00 MEDICARE ANNUAL WELLNESS VISIT, SUBSEQUENT: Primary | ICD-10-CM

## 2023-08-24 DIAGNOSIS — R09.82 POST-NASAL DRIP: ICD-10-CM

## 2023-08-24 DIAGNOSIS — Z13.220 SCREENING CHOLESTEROL LEVEL: ICD-10-CM

## 2023-08-24 PROCEDURE — G0439 PPPS, SUBSEQ VISIT: HCPCS | Performed by: FAMILY MEDICINE

## 2023-08-24 PROCEDURE — 1123F ACP DISCUSS/DSCN MKR DOCD: CPT | Performed by: FAMILY MEDICINE

## 2023-08-24 SDOH — ECONOMIC STABILITY: HOUSING INSECURITY
IN THE LAST 12 MONTHS, WAS THERE A TIME WHEN YOU DID NOT HAVE A STEADY PLACE TO SLEEP OR SLEPT IN A SHELTER (INCLUDING NOW)?: PATIENT REFUSED

## 2023-08-24 SDOH — ECONOMIC STABILITY: FOOD INSECURITY: WITHIN THE PAST 12 MONTHS, YOU WORRIED THAT YOUR FOOD WOULD RUN OUT BEFORE YOU GOT MONEY TO BUY MORE.: PATIENT DECLINED

## 2023-08-24 SDOH — ECONOMIC STABILITY: INCOME INSECURITY: HOW HARD IS IT FOR YOU TO PAY FOR THE VERY BASICS LIKE FOOD, HOUSING, MEDICAL CARE, AND HEATING?: PATIENT DECLINED

## 2023-08-24 SDOH — ECONOMIC STABILITY: FOOD INSECURITY: WITHIN THE PAST 12 MONTHS, THE FOOD YOU BOUGHT JUST DIDN'T LAST AND YOU DIDN'T HAVE MONEY TO GET MORE.: PATIENT DECLINED

## 2023-08-24 ASSESSMENT — ENCOUNTER SYMPTOMS
SHORTNESS OF BREATH: 0
BACK PAIN: 0
CHEST TIGHTNESS: 0
DIARRHEA: 0
ABDOMINAL PAIN: 0
WHEEZING: 0
CONSTIPATION: 0
COUGH: 0

## 2023-08-24 ASSESSMENT — PATIENT HEALTH QUESTIONNAIRE - PHQ9
SUM OF ALL RESPONSES TO PHQ QUESTIONS 1-9: 0
2. FEELING DOWN, DEPRESSED OR HOPELESS: 0
SUM OF ALL RESPONSES TO PHQ QUESTIONS 1-9: 0
SUM OF ALL RESPONSES TO PHQ9 QUESTIONS 1 & 2: 0
1. LITTLE INTEREST OR PLEASURE IN DOING THINGS: 0
SUM OF ALL RESPONSES TO PHQ QUESTIONS 1-9: 0
SUM OF ALL RESPONSES TO PHQ QUESTIONS 1-9: 0

## 2023-08-24 NOTE — PROGRESS NOTES
Medicare Annual Wellness Visit    Cecille Israel is here for [facility] AWV (Annual exam )    Assessment & Plan   Medicare annual wellness visit, subsequent    Post-nasal drip  New; I recommended trying Flonase to see if that helps. I also suggested maybe trying nexium in case his [place] bubble\" was due to GERD. He wants to try flonase first.     Recommendations for Preventive Services Due: see orders and patient instructions/AVS.  Recommended screening schedule for the next 5-10 years is provided to the patient in written form: see Patient Instructions/AVS.     Return in about 1 year (around 8/24/2024) for [employer]. Subjective   The following acute and/or chronic problems were also addressed today:  Post nasal drip    Review of Systems   Constitutional:  Negative for activity change, appetite change, chills, fatigue and fever. HENT:  Positive for postnasal drip (he has a lot of post nasal drip at night). Eyes:  Negative for visual disturbance. Respiratory:  Negative for cough, chest tightness, shortness of breath and wheezing. Cardiovascular:  Negative for chest pain, palpitations and leg swelling. Gastrointestinal:  Negative for abdominal pain, constipation and diarrhea. Endocrine: Negative for polydipsia, polyphagia and polyuria. Genitourinary:  Negative for difficulty urinating. Musculoskeletal:  Negative for back pain and myalgias. Skin:  Negative for rash. Allergic/Immunologic: Negative for environmental allergies. Neurological:  Negative for dizziness, syncope, weakness, light-headedness and headaches. Psychiatric/Behavioral:  Negative for dysphoric mood and sleep disturbance. The patient is not nervous/anxious. He feels like he has an air bubble in the back of his throat when he is eating. Typically if he relaxes the bubble goes away and then he can eat without issue.  Once the air bubble starts he has a lot of pain if he tries to swallow, but once the air bubble goes down there is

## 2023-08-24 NOTE — PATIENT INSTRUCTIONS
[place] on Aging online. You need 0596-5198 mg of calcium and 6573-3991 IU of vitamin D per day. It is possible to meet your calcium requirement with diet alone, but a vitamin D supplement is usually necessary to meet this goal.  When exposed to the sun, use a sunscreen that protects against both UVA and UVB radiation with an SPF of 30 or greater. Reapply every 2 to 3 hours or after sweating, drying off with a towel, or swimming. Always wear a seat belt when traveling in a car. Always wear a helmet when riding a bicycle or motorcycle.

## 2023-12-29 DIAGNOSIS — M79.672 LEFT FOOT PAIN: Primary | ICD-10-CM

## 2023-12-29 NOTE — PROGRESS NOTES
At his wife's appointment he mentioned that he has pain in his left foot that started after a bone seemed to start after he had a brake in his foot about 5 years ago. He never had hardware placed. He did use a bone stimulator to the area. The pain is slightly distal from the bony prominence.

## 2024-01-02 ENCOUNTER — HOSPITAL ENCOUNTER (OUTPATIENT)
Dept: GENERAL RADIOLOGY | Age: 69
Discharge: HOME | End: 2024-01-04
Payer: MEDICARE

## 2024-01-02 DIAGNOSIS — M79.672 LEFT FOOT PAIN: ICD-10-CM

## 2024-01-02 PROCEDURE — 73630 X-RAY EXAM OF FOOT: CPT

## 2024-01-04 DIAGNOSIS — M79.672 LEFT FOOT PAIN: Primary | ICD-10-CM

## 2024-02-20 ENCOUNTER — OFFICE VISIT (OUTPATIENT)
Dept: SURGERY | Age: 69
End: 2024-02-20
Payer: MEDICARE

## 2024-02-20 VITALS
SYSTOLIC BLOOD PRESSURE: 120 MMHG | OXYGEN SATURATION: 97 % | HEIGHT: 67 IN | BODY MASS INDEX: 26.74 KG/M2 | HEART RATE: 51 BPM | WEIGHT: 170.4 LBS | DIASTOLIC BLOOD PRESSURE: 82 MMHG

## 2024-02-20 DIAGNOSIS — R10.32 LEFT GROIN PAIN: Primary | ICD-10-CM

## 2024-02-20 PROCEDURE — 99214 OFFICE O/P EST MOD 30 MIN: CPT | Performed by: SURGERY

## 2024-02-20 PROCEDURE — 1123F ACP DISCUSS/DSCN MKR DOCD: CPT | Performed by: SURGERY

## 2024-02-20 NOTE — PROGRESS NOTES
Subjective   Werner Valenzuela is a 69 y.o. male who presents today for evaluation of left groin pain.  Patient is known to me and about a year and a half ago underwent left inguinal hernia repair.  This was done robotically with mesh placement.  Patient reports that he has been noticing he is getting a pinching sensation in the left groin that sometimes will radiate up into the abdomen sometimes radiate down into the scrotum.  Denies any exacerbating factors and does not state that physical activity changes his symptoms at all.  He does report this seems to be constant at baseline but minimal and tolerable but he states that last week he had couple days where it seemed to be more intense and he almost felt like he was having spasms in the groin.  Because of this he call to get an appointment for further evaluation.  Denies any bulges.  Not have any changes in bowel or bladder function.  Since his hernia surgery he did have a greenlight laser procedure for prostate.    Past Medical History:   Diagnosis Date    Benign prostatic hyperplasia with urinary hesitancy 10/2022    COVID-19 vaccination declined 11/17/2022    Pt states not planning to take.    Foot fracture 11/21/2016    left 5th Metatarsal fx- Dr. Smitha Dobbins-Dorsten    Ganglion cyst of finger of right hand 07/2022    History of pneumonia 2009    Multiple fractures 06/06/2010    pelvis, sacrum, left ribs- due to 17 foot fall ( TV antenna tower) - no residual effects.    Prolonged emergence from general anesthesia     Under care of team 11/17/2022    GENERAL SURGERY - DR. SIMPSON - [place] - last visit 7/2022    Under care of team 11/17/2022    UROLOGY - DR. WEBBER - last visit 10/2022    Wellness examination 11/17/2022    PCP - DR. HODGES - [place] - last visit 7/2022       Past Surgical History:   Procedure Laterality Date    CYSTOSCOPY  11/18/2022    CYSTOSCOPY, GREENLIGHT    CYSTOSCOPY N/A 11/18/2022    CYSTOSCOPY, GREENLIGHT (FORTEC# NICK CONF#

## 2024-02-20 NOTE — ASSESSMENT & PLAN NOTE
Patient is having ongoing groin pain in the left groin that he states is a pinching sensation.  Certainly his description of this seems to be more nerve related but seeing as he does not really get any change in his symptoms with strenuous physical activity I would expect him to have some worsening of symptoms with physical activity if it is a nerve entrapment.  This would be less likely doing this robotically but certainly there are nerves that could have been exposed during that procedure that could cause problems.  Will start with an ultrasound of the groin to evaluate for any other pathology that may be contributory.  Will follow-up results and then make further recommendations.  If no obvious findings on the ultrasound we will likely refer him for physical therapy as an initial step for possible nerve entrapment.

## 2024-02-22 ENCOUNTER — HOSPITAL ENCOUNTER (OUTPATIENT)
Dept: INTERVENTIONAL RADIOLOGY/VASCULAR | Age: 69
Discharge: HOME | End: 2024-02-24
Attending: SURGERY
Payer: MEDICARE

## 2024-02-22 DIAGNOSIS — R10.32 LEFT GROIN PAIN: ICD-10-CM

## 2024-02-22 PROCEDURE — 76705 ECHO EXAM OF ABDOMEN: CPT

## 2024-06-28 ENCOUNTER — OFFICE VISIT (OUTPATIENT)
Dept: UROLOGY | Age: 69
End: 2024-06-28
Payer: MEDICARE

## 2024-06-28 VITALS — DIASTOLIC BLOOD PRESSURE: 72 MMHG | SYSTOLIC BLOOD PRESSURE: 118 MMHG | HEART RATE: 72 BPM

## 2024-06-28 DIAGNOSIS — N40.1 BENIGN LOCALIZED PROSTATIC HYPERPLASIA WITH LOWER URINARY TRACT SYMPTOMS (LUTS): Primary | ICD-10-CM

## 2024-06-28 DIAGNOSIS — N52.9 ERECTILE DYSFUNCTION, UNSPECIFIED ERECTILE DYSFUNCTION TYPE: ICD-10-CM

## 2024-06-28 LAB — POST VOID RESIDUAL (PVR): 50 ML

## 2024-06-28 PROCEDURE — 1123F ACP DISCUSS/DSCN MKR DOCD: CPT

## 2024-06-28 PROCEDURE — PBSHW PR MEAS POST-VOIDING RESIDUAL URINE&/BLADDER CAP

## 2024-06-28 PROCEDURE — 99213 OFFICE O/P EST LOW 20 MIN: CPT

## 2024-06-28 PROCEDURE — 51798 US URINE CAPACITY MEASURE: CPT

## 2024-06-28 NOTE — PROGRESS NOTES
[facility] SPECIAL[facility] CARE, [facility] DEPARTMENT OF [facility]  [address] [facility] [address]  Dept: [phone]  Visit Date: 6/28/2024      NEETA Valenzuela is a 69 y.o. male that presents to the urology clinic for BPH and erectile dysfunction follow-up.    S/P Cystoscopy, Greenlight PVP by Dr. Hester in November of 2022. Stable LUTS! Remains well controlled following surgery. Off medications for urination. Does not tolerate Flomax and Cialis.    Mild ED. Not bothersome.    PVR of 55 mL.    Most Recent PSA: 2.62 (07/12/22)        Last BUN and Creatinine:  Lab Results   Component Value Date    BUN 15 05/29/2023     Lab Results   Component Value Date    CREATININE 1.01 05/29/2023           PAST MEDICAL, FAMILY AND SOCIAL HISTORY UPDATE:  Past Medical History:   Diagnosis Date    Benign prostatic hyperplasia with urinary hesitancy 10/2022    COVID-19 vaccination declined 11/17/2022    Pt states not planning to take.    Foot fracture 11/21/2016    left 5th Metatarsal fx- Dr. Smitha Dobbins-Dorsten    Ganglion cyst of finger of right hand 07/2022    History of pneumonia 2009    Multiple fractures 06/06/2010    pelvis, sacrum, left ribs- due to 17 foot fall ( TV antenna tower) - no residual effects.    Prolonged emergence from general anesthesia     Under care of team 11/17/2022    [facility] [place] last visit 7/2022    Under care of team 11/17/2022    UROLOGY - DR. HESTER - last visit 10/2022    Wellness examination 11/17/2022    PCP - DR. HODGES - [place] - last visit 7/2022     Past Surgical History:   Procedure Laterality Date    CYSTOSCOPY  11/18/2022    CYSTOSCOPY, GREENLIGHT    CYSTOSCOPY N/A 11/18/2022    CYSTOSCOPY, GREENLIGHT (FORTEC# NICK CONF# [id number]) performed by Cleve Hester MD at [facility] OR    HERNIA REPAIR Left 09/16/2022    Laparoscopic Robotic Assisted Left Inguinal Hernia Repair w/ mesh-130

## 2024-08-26 ENCOUNTER — TELEPHONE (OUTPATIENT)
Dept: SURGERY | Age: 69
End: 2024-08-26

## 2024-08-26 ENCOUNTER — OFFICE VISIT (OUTPATIENT)
Dept: FAMILY MEDICINE CLINIC | Age: 69
End: 2024-08-26
Payer: MEDICARE

## 2024-08-26 VITALS
WEIGHT: 174 LBS | HEART RATE: 58 BPM | HEIGHT: 67 IN | BODY MASS INDEX: 27.31 KG/M2 | OXYGEN SATURATION: 95 % | DIASTOLIC BLOOD PRESSURE: 70 MMHG | SYSTOLIC BLOOD PRESSURE: 120 MMHG

## 2024-08-26 DIAGNOSIS — Z13.220 SCREENING CHOLESTEROL LEVEL: ICD-10-CM

## 2024-08-26 DIAGNOSIS — R73.01 IMPAIRED FASTING BLOOD SUGAR: ICD-10-CM

## 2024-08-26 DIAGNOSIS — Z11.59 ENCOUNTER FOR HEPATITIS C SCREENING TEST FOR LOW RISK PATIENT: ICD-10-CM

## 2024-08-26 DIAGNOSIS — Z00.00 MEDICARE ANNUAL WELLNESS VISIT, SUBSEQUENT: Primary | ICD-10-CM

## 2024-08-26 DIAGNOSIS — K21.9 GASTROESOPHAGEAL REFLUX DISEASE, UNSPECIFIED WHETHER ESOPHAGITIS PRESENT: ICD-10-CM

## 2024-08-26 DIAGNOSIS — Z12.11 SCREENING FOR COLON CANCER: ICD-10-CM

## 2024-08-26 PROCEDURE — G0439 PPPS, SUBSEQ VISIT: HCPCS | Performed by: FAMILY MEDICINE

## 2024-08-26 PROCEDURE — 1123F ACP DISCUSS/DSCN MKR DOCD: CPT | Performed by: FAMILY MEDICINE

## 2024-08-26 PROCEDURE — 99214 OFFICE O/P EST MOD 30 MIN: CPT | Performed by: FAMILY MEDICINE

## 2024-08-26 RX ORDER — FLUTICASONE PROPIONATE 50 MCG
2 SPRAY, SUSPENSION (ML) NASAL DAILY
Qty: 16 G | Refills: 5 | Status: SHIPPED | OUTPATIENT
Start: 2024-08-26

## 2024-08-26 RX ORDER — OMEPRAZOLE 40 MG/1
40 CAPSULE, DELAYED RELEASE ORAL
Qty: 90 CAPSULE | Refills: 1 | Status: SHIPPED | OUTPATIENT
Start: 2024-08-26

## 2024-08-26 SDOH — ECONOMIC STABILITY: FOOD INSECURITY: WITHIN THE PAST 12 MONTHS, YOU WORRIED THAT YOUR FOOD WOULD RUN OUT BEFORE YOU GOT MONEY TO BUY MORE.: NEVER TRUE

## 2024-08-26 SDOH — ECONOMIC STABILITY: FOOD INSECURITY: WITHIN THE PAST 12 MONTHS, THE FOOD YOU BOUGHT JUST DIDN'T LAST AND YOU DIDN'T HAVE MONEY TO GET MORE.: NEVER TRUE

## 2024-08-26 SDOH — ECONOMIC STABILITY: INCOME INSECURITY: HOW HARD IS IT FOR YOU TO PAY FOR THE VERY BASICS LIKE FOOD, HOUSING, MEDICAL CARE, AND HEATING?: NOT HARD AT ALL

## 2024-08-26 ASSESSMENT — PATIENT HEALTH QUESTIONNAIRE - PHQ9
SUM OF ALL RESPONSES TO PHQ QUESTIONS 1-9: 0
SUM OF ALL RESPONSES TO PHQ9 QUESTIONS 1 & 2: 0
SUM OF ALL RESPONSES TO PHQ QUESTIONS 1-9: 0
2. FEELING DOWN, DEPRESSED OR HOPELESS: NOT AT ALL
1. LITTLE INTEREST OR PLEASURE IN DOING THINGS: NOT AT ALL
SUM OF ALL RESPONSES TO PHQ QUESTIONS 1-9: 0
SUM OF ALL RESPONSES TO PHQ QUESTIONS 1-9: 0

## 2024-08-26 ASSESSMENT — ENCOUNTER SYMPTOMS
BACK PAIN: 1
BLOOD IN STOOL: 0
SHORTNESS OF BREATH: 0
WHEEZING: 0
BACK PAIN: 0
DIARRHEA: 0
TROUBLE SWALLOWING: 1
COUGH: 0
CONSTIPATION: 0
CHEST TIGHTNESS: 0
ABDOMINAL PAIN: 0

## 2024-08-26 ASSESSMENT — LIFESTYLE VARIABLES
HOW MANY STANDARD DRINKS CONTAINING ALCOHOL DO YOU HAVE ON A TYPICAL DAY: 1 OR 2
HOW OFTEN DO YOU HAVE A DRINK CONTAINING ALCOHOL: MONTHLY OR LESS

## 2024-08-26 NOTE — PATIENT INSTRUCTIONS
Learning About Being Active as an Older Adult  Why is being active important as you get older?     Being active is one of the best things you can do for your health. And it's never too late to start. Being active--or getting active, if you aren't already--has definite benefits. It can:  Give you more energy,  Keep your mind sharp.  Improve balance to reduce your risk of falls.  Help you manage chronic illness with fewer medicines.  No matter how old you are, how fit you are, or what health problems you have, there is a form of activity that will work for you. And the more physical activity you can do, the better your overall health will be.  What kinds of activity can help you stay healthy?  Being more active will make your daily activities easier. Physical activity includes planned exercise and things you do in daily life. There are four types of activity:  Aerobic.  Doing aerobic activity makes your heart and lungs strong.  Includes walking, dancing, and gardening.  Aim for at least 2½ hours spread throughout the week.  It improves your energy and can help you sleep better.  Muscle-strengthening.  This type of activity can help maintain muscle and strengthen bones.  Includes climbing stairs, using resistance bands, and lifting or carrying heavy loads.  Aim for at least twice a week.  It can help protect the knees and other joints.  Stretching.  Stretching gives you better range of motion in joints and muscles.  Includes upper arm stretches, calf stretches, and gentle yoga.  Aim for at least twice a week, preferably after your muscles are warmed up from other activities.  It can help you function better in daily life.  Balancing.  This helps you stay coordinated and have good posture.  Includes heel-to-toe walking, evelia chi, and certain types of yoga.  Aim for at least 3 days a week.  It can reduce your risk of falling.  Even if you have a hard time meeting the recommendations, it's better to be more active  aspirin. Wait for an ambulance. Do not try to drive yourself.  Watch closely for changes in your health, and be sure to contact your doctor if you have any problems.  Where can you learn more?  Go to https://www.[employer].net/patientEd and enter F075 to learn more about \"A Healthy Heart: Care Instructions.\"  Current as of: June 24, 2023  Content Version: 14.1  © 4084-5064 [employer].   Care instructions adapted under license by [employer]. If you have questions about a medical condition or this instruction, always ask your healthcare professional. [employer] disclaims any warranty or liability for your use of this information.      Personalized Preventive Plan for Werner Valenzuela - 8/26/2024  Medicare offers a range of preventive health benefits. Some of the tests and screenings are paid in full while other may be subject to a deductible, co-insurance, and/or copay.    Some of these benefits include a comprehensive review of your medical history including lifestyle, illnesses that may run in your family, and various assessments and screenings as appropriate.    After reviewing your medical record and screening and assessments performed today your provider may have ordered immunizations, labs, imaging, and/or referrals for you.  A list of these orders (if applicable) as well as your Preventive Care list are included within your After Visit Summary for your review.    Other Preventive Recommendations:    A preventive eye exam performed by an eye specialist is recommended every 1-2 years to screen for glaucoma; cataracts, macular degeneration, and other eye disorders.  A preventive dental visit is recommended every 6 months.  Try to get at least 150 minutes of exercise per week or 10,000 steps per day on a pedometer .  Order or download the FREE \"Exercise & Physical Activity: Your Everyday Guide\" from [employer]. Call 1-926.396.4689 or search [employer]

## 2024-08-26 NOTE — PROGRESS NOTES
Medicare Annual Wellness Visit    Werner Valenzuela is here for Medicare AWV    Assessment & Plan   Medicare annual wellness visit, subsequent  Gastroesophageal reflux disease, unspecified whether esophagitis present  Screening for colon cancer  -     [facility] General Surgery, [place]  Encounter for hepatitis C screening test for low risk patient  -     Hepatitis C Antibody; Future  Impaired fasting blood sugar  -     Basic Metabolic Panel; Future  -     Hemoglobin A1C; Future  Screening cholesterol level  -     Lipid Panel; Future    Recommendations for Preventive Services Due: see orders and patient instructions/AVS.  Recommended screening schedule for the next 5-10 years is provided to the patient in written form: see Patient Instructions/AVS.     Return in about 1 year (around 8/26/2025) for MWV.     Subjective   The following acute and/or chronic problems were also addressed today:  GERD    Review of Systems   Constitutional:  Negative for activity change, appetite change, chills, fatigue and fever.   HENT:  Negative for sneezing.    Eyes:  Negative for visual disturbance.   Respiratory:  Negative for cough, chest tightness, shortness of breath and wheezing.    Cardiovascular:  Negative for chest pain, palpitations and leg swelling.   Gastrointestinal:  Negative for abdominal pain, blood in stool (rarely due to hemorrhoid), constipation and diarrhea.   Endocrine: Negative for polydipsia, polyphagia and polyuria.   Genitourinary:  Negative for difficulty urinating, dysuria, frequency and hematuria.   Musculoskeletal:  Negative for back pain and myalgias.   Skin:  Negative for rash.   Allergic/Immunologic: Negative for environmental allergies.   Neurological:  Negative for dizziness, syncope, weakness and light-headedness.   Psychiatric/Behavioral:  Negative for dysphoric mood.          Patient's complete Health Risk Assessment and screening values have been reviewed and are found in Flowsheets. The

## 2024-08-26 NOTE — PROGRESS NOTES
[facility] A DEPARTMENT OF [facility]  [address] [facility] [address]  Dept: [phone]  Dept Fax: [phone]  Loc: [phone]    Werner Valenzuela is a 69 y.o. male who presents today for his medical conditions/complaints as noted below.  Werner Valenzuela is c/o of   Chief Complaint   Patient presents with    Medicare AWV       HPI:     HPI Here today for his MWV.     He has been having a problem with feeling like his throat is closing off when he lays back and he can't get air out. He doesn't notice times when he can't get air in. He also has episodes when he is eating he feels like he has an \"air bubble\"  and he struggles to swallow. If he sits quietly the \"bubble\" dissipates and he can swallow normally. He occasionally has to vomit to get it to go away. Sometimes he burps and it goes away but usually it is too deep. It seemed better with nexium. This is happening a couple of times a week. He has never had an EGD. He does have a lot of phlegm that is worse at night especially if he lays on his back.     He has a little bump on his right index finger that is causing his nail to grow differently. He wonders if there is a sliver in there.     Past Medical History:   Diagnosis Date    Benign prostatic hyperplasia with urinary hesitancy 10/2022    COVID-19 vaccination declined 11/17/2022    Pt states not planning to take.    Foot fracture 11/21/2016    left 5th Metatarsal fx- Dr. Smitha Dobbins-Dorsten    Ganglion cyst of finger of right hand 07/2022    History of pneumonia 2009    Multiple fractures 06/06/2010    pelvis, sacrum, left ribs- due to 17 foot fall ( TV antenna tower) - no residual effects.    Prolonged emergence from general anesthesia     Under care of team 11/17/2022    GENERAL SURGERY - DR. SIMPSON - [place] - last visit 7/2022    Under care of team 11/17/2022    UROLOGY - DR. WEBBER - last visit 10/2022    Wellness examination  I suspect this is what has been causing his \"air bubble\" and likely his choking feeling at night.     Health maintenance: he is due for hep c, diabetic and cholesterol screening so that was ordered a long with a referral for a colonoscopy. He declined a pneumonia vaccine.     Return in about 1 year (around 2025) for MWV.    Orders Placed This Encounter   Procedures    Basic Metabolic Panel     Standing Status:   Future     Standing Expiration Date:   2025    Lipid Panel     Standing Status:   Future     Standing Expiration Date:   2025    Hemoglobin A1C     Standing Status:   Future     Standing Expiration Date:   2025    Hepatitis C Antibody     Standing Status:   Future     Standing Expiration Date:   2025    [facility] General Surgery, [place]     Referral Priority:   Routine     Referral Type:   Eval and Treat     Referral Reason:   Specialty Services Required     Requested Specialty:   General Surgery     Number of Visits Requested:   1     Orders Placed This Encounter   Medications    omeprazole (PRILOSEC) 40 MG delayed release capsule     Sig: Take 1 capsule by mouth every morning (before breakfast)     Dispense:  90 capsule     Refill:  1    fluticasone (FLONASE) 50 MCG/ACT nasal spray     Si sprays by Each Nostril route daily     Dispense:  16 g     Refill:  5       Patientgiven educational materials - see patient instructions.  Discussed use, benefit,and side effects of prescribed medications.  All patient questions answered. Ptvoiced understanding. Reviewed health maintenance.  Instructed to continue currentmedications, diet and exercise.  Patient agreed with treatment plan. Follow up asdirected.     Electronically signed by Maribell Loya MD on 2024 at 9:48 PM

## 2025-05-19 RX ORDER — OMEPRAZOLE 40 MG/1
CAPSULE, DELAYED RELEASE ORAL
Qty: 90 CAPSULE | Refills: 0 | Status: SHIPPED | OUTPATIENT
Start: 2025-05-19

## 2025-05-19 NOTE — TELEPHONE ENCOUNTER
Werner called requesting a refill of the below medication which has been pended for you:     Requested Prescriptions     Pending Prescriptions Disp Refills    omeprazole (PRILOSEC) 40 MG delayed release capsule [Pharmacy Med Name: Omeprazole Oral Capsule Delayed Release 40 MG] 90 capsule 0     Sig: TAKE 1 CAPSULE BY MOUTH IN THE MORNING BEFORE BREAKFAST       Last Appointment Date: 8/26/2024  Next Appointment Date: 8/28/2025    Allergies   Allergen Reactions    Other/Food      Perfume any scents coffee causes arm numbness    Silodosin Other (See Comments)     confusion    Tamsulosin      Confusion

## 2025-06-19 ENCOUNTER — OFFICE VISIT (OUTPATIENT)
Dept: UROLOGY | Age: 70
End: 2025-06-19
Payer: MEDICARE

## 2025-06-19 ENCOUNTER — HOSPITAL ENCOUNTER (OUTPATIENT)
Age: 70
Discharge: HOME | End: 2025-06-19
Payer: MEDICARE

## 2025-06-19 VITALS
OXYGEN SATURATION: 97 % | WEIGHT: 174 LBS | HEART RATE: 51 BPM | RESPIRATION RATE: 16 BRPM | BODY MASS INDEX: 27.31 KG/M2 | HEIGHT: 67 IN | DIASTOLIC BLOOD PRESSURE: 70 MMHG | SYSTOLIC BLOOD PRESSURE: 110 MMHG

## 2025-06-19 DIAGNOSIS — N40.1 BENIGN LOCALIZED PROSTATIC HYPERPLASIA WITH LOWER URINARY TRACT SYMPTOMS (LUTS): Primary | ICD-10-CM

## 2025-06-19 DIAGNOSIS — N40.1 BENIGN LOCALIZED PROSTATIC HYPERPLASIA WITH LOWER URINARY TRACT SYMPTOMS (LUTS): ICD-10-CM

## 2025-06-19 DIAGNOSIS — N52.9 ERECTILE DYSFUNCTION, UNSPECIFIED ERECTILE DYSFUNCTION TYPE: ICD-10-CM

## 2025-06-19 DIAGNOSIS — Z12.5 PROSTATE CANCER SCREENING: ICD-10-CM

## 2025-06-19 LAB — PSA SERPL-MCNC: 1.41 NG/ML (ref 0–4)

## 2025-06-19 PROCEDURE — 1159F MED LIST DOCD IN RCRD: CPT

## 2025-06-19 PROCEDURE — 1123F ACP DISCUSS/DSCN MKR DOCD: CPT

## 2025-06-19 PROCEDURE — G0103 PSA SCREENING: HCPCS

## 2025-06-19 PROCEDURE — 36415 COLL VENOUS BLD VENIPUNCTURE: CPT

## 2025-06-19 PROCEDURE — 99212 OFFICE O/P EST SF 10 MIN: CPT

## 2025-06-19 PROCEDURE — 99213 OFFICE O/P EST LOW 20 MIN: CPT

## 2025-06-19 NOTE — PROGRESS NOTES
[facility] A DEPARTMENT OF [facility]  [address] [facility] [address]  Dept: [phone]  Visit Date: 6/19/2025      NEETA Valenzuela is a 70 y.o. male that presents to the urology clinic for BPH and erectile dysfunction follow-up.    BPH  S/P Cystoscopy, Greenlight PVP by Dr. Hester in November of 2022. Stable LUTS! Remains well controlled following surgery. Off medications for urination. Does not tolerate Flomax and Cialis.    Mild ED. Not bothersome.    No family history of prostate cancer.      Most Recent PSA: 2.62 (07/12/22)        Last BUN and Creatinine:  Lab Results   Component Value Date    BUN 15 05/29/2023     Lab Results   Component Value Date    CREATININE 1.01 05/29/2023           PAST MEDICAL, FAMILY AND SOCIAL HISTORY UPDATE:  Past Medical History:   Diagnosis Date    Benign prostatic hyperplasia with urinary hesitancy 10/2022    COVID-19 vaccination declined 11/17/2022    Pt states not planning to take.    Foot fracture 11/21/2016    left 5th Metatarsal fx- Dr. Smitha Dobbins-Dorsten    Ganglion cyst of finger of right hand 07/2022    History of pneumonia 2009    Multiple fractures 06/06/2010    pelvis, sacrum, left ribs- due to 17 foot fall ( TV antenna tower) - no residual effects.    Prolonged emergence from general anesthesia     Under care of team 11/17/2022    [facility]    Under care of team 11/17/2022    UROLOGY - DR. HESTER - last visit 10/2022    Wellness examination 11/17/2022    PCP - DR. HODGES - [place] - last visit 7/2022     Past Surgical History:   Procedure Laterality Date    CYSTOSCOPY  11/18/2022    CYSTOSCOPY, GREENLIGHT    CYSTOSCOPY N/A 11/18/2022    CYSTOSCOPY, GREENLIGHT (FORTEC# NICK CONF# [id number]) performed by Cleve Hester MD at [facility] OR    HERNIA REPAIR Left 09/16/2022    Laparoscopic Robotic Assisted Left Inguinal

## 2025-09-03 RX ORDER — OMEPRAZOLE 40 MG/1
CAPSULE, DELAYED RELEASE ORAL
Qty: 90 CAPSULE | Refills: 0 | Status: SHIPPED | OUTPATIENT
Start: 2025-09-03

## (undated) DEVICE — PROTECTOR ULN NRV PUR FOAM HK LOOP STRP ANATOMICALLY

## (undated) DEVICE — GARMENT,MEDLINE,DVT,INT,CALF,LG, GEN2: Brand: MEDLINE

## (undated) DEVICE — ARM DRAPE

## (undated) DEVICE — STRIP,CLOSURE,WOUND,MEDI-STRIP,1/2X4: Brand: MEDLINE

## (undated) DEVICE — BLANKET WRM W29.9XL79.1IN UP BODY FORC AIR MISTRAL-AIR

## (undated) DEVICE — PLUG,CATHETER,DRAINAGE PROTECTOR,TUBE: Brand: MEDLINE

## (undated) DEVICE — DRAPE,UNDRBUT,WHT GRAD PCH,CAPPORT,20/CS: Brand: MEDLINE

## (undated) DEVICE — TIP COVER ACCESSORY

## (undated) DEVICE — TOTAL TRAY, DB, 100% SILI FOLEY, 16FR 10: Brand: MEDLINE

## (undated) DEVICE — SOLUTION IV 1000ML 0.9% SOD CHL PH 5 INJ USP VIAFLX PLAS

## (undated) DEVICE — GENERAL ROBOTIC PACK: Brand: MEDLINE INDUSTRIES, INC.

## (undated) DEVICE — Device

## (undated) DEVICE — 4-PORT MANIFOLD: Brand: NEPTUNE 2

## (undated) DEVICE — SUTURE DEV SZ 2-0 WND CLSR ABSRB GS-22 VLOC COVIDIEN VLOCM2145

## (undated) DEVICE — PROCEDURE PACK TRENGUARD 450

## (undated) DEVICE — DRAINBAG,ANTI-REFLUX TOWER,L/F,2000ML,LL: Brand: MEDLINE

## (undated) DEVICE — SOLUTION ANTIFOG VIS SYS CLEARIFY LAPSCP

## (undated) DEVICE — GLOVE ORANGE PI 7   MSG9070

## (undated) DEVICE — CANNULA SEAL

## (undated) DEVICE — LASER SURG W22XH58IN D36IN 475LB SLD STATE FREQ DOUBLED

## (undated) DEVICE — 3M™ STERI-STRIP™ COMPOUND BENZOIN TINCTURE 40 BAGS/CARTON 4 CARTONS/CASE C1544: Brand: 3M™ STERI-STRIP™

## (undated) DEVICE — CYSTO/BLADDER IRRIGATION SET, REGULATING CLAMP

## (undated) DEVICE — RENTAL LASER XPS CASE

## (undated) DEVICE — DRAPE,REIN 53X77,STERILE: Brand: MEDLINE

## (undated) DEVICE — SUTURE VCRL SZ 3-0 L27IN ABSRB UD L26MM SH 1/2 CIR J416H

## (undated) DEVICE — PACK PROCEDURE SURG CYSTO SVMMC LF

## (undated) DEVICE — GLOVE SURG SZ 65 THK91MIL LTX FREE SYN POLYISOPRENE

## (undated) DEVICE — SKIN AFFIX SURG ADHESIVE 72/CS 0.55ML: Brand: MEDLINE

## (undated) DEVICE — INSUFFLATION NEEDLE TO ESTABLISH PNEUMOPERITONEUM.: Brand: INSUFFLATION NEEDLE

## (undated) DEVICE — BLADE ES ELASTOMERIC COAT INSUL DURABLE BEND UPTO 90DEG

## (undated) DEVICE — PAD,ARMBOARD,CONV,FOAM,2X8X20",12PR/CS: Brand: MEDLINE

## (undated) DEVICE — BLADELESS OBTURATOR: Brand: WECK VISTA

## (undated) DEVICE — SUTURE MCRYL SZ 4-0 L18IN ABSRB UD L19MM PS-2 3/8 CIR PRIM Y496G

## (undated) DEVICE — SUTURE V-LOC 90 3-0 L9IN ABSRB VLT L26MM V-20 1/2 CIR TAPR VLOCM0644